# Patient Record
Sex: FEMALE | Race: OTHER | NOT HISPANIC OR LATINO | URBAN - METROPOLITAN AREA
[De-identification: names, ages, dates, MRNs, and addresses within clinical notes are randomized per-mention and may not be internally consistent; named-entity substitution may affect disease eponyms.]

---

## 2021-02-02 ENCOUNTER — INPATIENT (INPATIENT)
Facility: HOSPITAL | Age: 26
LOS: 0 days | Discharge: HOME | End: 2021-02-03
Attending: OBSTETRICS & GYNECOLOGY | Admitting: OBSTETRICS & GYNECOLOGY

## 2021-02-02 VITALS
TEMPERATURE: 99 F | SYSTOLIC BLOOD PRESSURE: 130 MMHG | DIASTOLIC BLOOD PRESSURE: 83 MMHG | HEART RATE: 111 BPM | RESPIRATION RATE: 16 BRPM

## 2021-02-02 DIAGNOSIS — O26.899 OTHER SPECIFIED PREGNANCY RELATED CONDITIONS, UNSPECIFIED TRIMESTER: ICD-10-CM

## 2021-02-02 DIAGNOSIS — Z3A.00 WEEKS OF GESTATION OF PREGNANCY NOT SPECIFIED: ICD-10-CM

## 2021-02-02 LAB
BASOPHILS # BLD AUTO: 0.06 K/UL — SIGNIFICANT CHANGE UP (ref 0–0.2)
BASOPHILS NFR BLD AUTO: 0.4 % — SIGNIFICANT CHANGE UP (ref 0–2)
BLD GP AB SCN SERPL QL: NEGATIVE — SIGNIFICANT CHANGE UP
EOSINOPHIL # BLD AUTO: 0.11 K/UL — SIGNIFICANT CHANGE UP (ref 0–0.5)
EOSINOPHIL NFR BLD AUTO: 0.8 % — SIGNIFICANT CHANGE UP (ref 0–6)
HCT VFR BLD CALC: 38.8 % — SIGNIFICANT CHANGE UP (ref 34.5–45)
HGB BLD-MCNC: 12.7 G/DL — SIGNIFICANT CHANGE UP (ref 11.5–15.5)
IANC: 9.93 K/UL — HIGH (ref 1.5–8.5)
IMM GRANULOCYTES NFR BLD AUTO: 0.9 % — SIGNIFICANT CHANGE UP (ref 0–1.5)
LYMPHOCYTES # BLD AUTO: 21.7 % — SIGNIFICANT CHANGE UP (ref 13–44)
LYMPHOCYTES # BLD AUTO: 3.07 K/UL — SIGNIFICANT CHANGE UP (ref 1–3.3)
MCHC RBC-ENTMCNC: 27.7 PG — SIGNIFICANT CHANGE UP (ref 27–34)
MCHC RBC-ENTMCNC: 32.7 GM/DL — SIGNIFICANT CHANGE UP (ref 32–36)
MCV RBC AUTO: 84.5 FL — SIGNIFICANT CHANGE UP (ref 80–100)
MONOCYTES # BLD AUTO: 0.84 K/UL — SIGNIFICANT CHANGE UP (ref 0–0.9)
MONOCYTES NFR BLD AUTO: 5.9 % — SIGNIFICANT CHANGE UP (ref 2–14)
NEUTROPHILS # BLD AUTO: 9.93 K/UL — HIGH (ref 1.8–7.4)
NEUTROPHILS NFR BLD AUTO: 70.3 % — SIGNIFICANT CHANGE UP (ref 43–77)
NRBC # BLD: 0 /100 WBCS — SIGNIFICANT CHANGE UP
NRBC # FLD: 0 K/UL — SIGNIFICANT CHANGE UP
PLATELET # BLD AUTO: 254 K/UL — SIGNIFICANT CHANGE UP (ref 150–400)
RBC # BLD: 4.59 M/UL — SIGNIFICANT CHANGE UP (ref 3.8–5.2)
RBC # FLD: 15.7 % — HIGH (ref 10.3–14.5)
RH IG SCN BLD-IMP: POSITIVE — SIGNIFICANT CHANGE UP
RH IG SCN BLD-IMP: POSITIVE — SIGNIFICANT CHANGE UP
SARS-COV-2 IGG SERPL QL IA: POSITIVE
SARS-COV-2 IGM SERPL IA-ACNC: 23.4 INDEX — HIGH
SARS-COV-2 RNA SPEC QL NAA+PROBE: SIGNIFICANT CHANGE UP
T PALLIDUM AB TITR SER: NEGATIVE — SIGNIFICANT CHANGE UP
WBC # BLD: 14.14 K/UL — HIGH (ref 3.8–10.5)
WBC # FLD AUTO: 14.14 K/UL — HIGH (ref 3.8–10.5)

## 2021-02-02 RX ORDER — HYDROCORTISONE 1 %
1 OINTMENT (GRAM) TOPICAL EVERY 6 HOURS
Refills: 0 | Status: DISCONTINUED | OUTPATIENT
Start: 2021-02-02 | End: 2021-02-03

## 2021-02-02 RX ORDER — LANOLIN
1 OINTMENT (GRAM) TOPICAL EVERY 6 HOURS
Refills: 0 | Status: DISCONTINUED | OUTPATIENT
Start: 2021-02-02 | End: 2021-02-03

## 2021-02-02 RX ORDER — AER TRAVELER 0.5 G/1
1 SOLUTION RECTAL; TOPICAL EVERY 4 HOURS
Refills: 0 | Status: DISCONTINUED | OUTPATIENT
Start: 2021-02-02 | End: 2021-02-03

## 2021-02-02 RX ORDER — SODIUM CHLORIDE 9 MG/ML
500 INJECTION INTRAMUSCULAR; INTRAVENOUS; SUBCUTANEOUS ONCE
Refills: 0 | Status: DISCONTINUED | OUTPATIENT
Start: 2021-02-02 | End: 2021-02-03

## 2021-02-02 RX ORDER — MAGNESIUM HYDROXIDE 400 MG/1
30 TABLET, CHEWABLE ORAL
Refills: 0 | Status: DISCONTINUED | OUTPATIENT
Start: 2021-02-02 | End: 2021-02-03

## 2021-02-02 RX ORDER — SODIUM CHLORIDE 9 MG/ML
1000 INJECTION INTRAMUSCULAR; INTRAVENOUS; SUBCUTANEOUS
Refills: 0 | Status: DISCONTINUED | OUTPATIENT
Start: 2021-02-02 | End: 2021-02-03

## 2021-02-02 RX ORDER — SODIUM CHLORIDE 9 MG/ML
3 INJECTION INTRAMUSCULAR; INTRAVENOUS; SUBCUTANEOUS EVERY 8 HOURS
Refills: 0 | Status: DISCONTINUED | OUTPATIENT
Start: 2021-02-02 | End: 2021-02-03

## 2021-02-02 RX ORDER — SODIUM CHLORIDE 9 MG/ML
500 INJECTION, SOLUTION INTRAVENOUS ONCE
Refills: 0 | Status: COMPLETED | OUTPATIENT
Start: 2021-02-02 | End: 2021-02-02

## 2021-02-02 RX ORDER — OXYCODONE HYDROCHLORIDE 5 MG/1
5 TABLET ORAL ONCE
Refills: 0 | Status: DISCONTINUED | OUTPATIENT
Start: 2021-02-02 | End: 2021-02-03

## 2021-02-02 RX ORDER — KETOROLAC TROMETHAMINE 30 MG/ML
30 SYRINGE (ML) INJECTION ONCE
Refills: 0 | Status: DISCONTINUED | OUTPATIENT
Start: 2021-02-02 | End: 2021-02-02

## 2021-02-02 RX ORDER — AMPICILLIN TRIHYDRATE 250 MG
2 CAPSULE ORAL ONCE
Refills: 0 | Status: COMPLETED | OUTPATIENT
Start: 2021-02-02 | End: 2021-02-02

## 2021-02-02 RX ORDER — ACETAMINOPHEN 500 MG
975 TABLET ORAL
Refills: 0 | Status: DISCONTINUED | OUTPATIENT
Start: 2021-02-02 | End: 2021-02-03

## 2021-02-02 RX ORDER — SODIUM CHLORIDE 9 MG/ML
1000 INJECTION, SOLUTION INTRAVENOUS
Refills: 0 | Status: DISCONTINUED | OUTPATIENT
Start: 2021-02-02 | End: 2021-02-02

## 2021-02-02 RX ORDER — OXYCODONE HYDROCHLORIDE 5 MG/1
5 TABLET ORAL
Refills: 0 | Status: DISCONTINUED | OUTPATIENT
Start: 2021-02-02 | End: 2021-02-03

## 2021-02-02 RX ORDER — IBUPROFEN 200 MG
600 TABLET ORAL EVERY 6 HOURS
Refills: 0 | Status: DISCONTINUED | OUTPATIENT
Start: 2021-02-02 | End: 2021-02-03

## 2021-02-02 RX ORDER — OXYTOCIN 10 UNIT/ML
VIAL (ML) INJECTION
Qty: 20 | Refills: 0 | Status: DISCONTINUED | OUTPATIENT
Start: 2021-02-02 | End: 2021-02-02

## 2021-02-02 RX ORDER — OXYTOCIN 10 UNIT/ML
2 VIAL (ML) INJECTION
Qty: 30 | Refills: 0 | Status: DISCONTINUED | OUTPATIENT
Start: 2021-02-02 | End: 2021-02-03

## 2021-02-02 RX ORDER — SIMETHICONE 80 MG/1
80 TABLET, CHEWABLE ORAL EVERY 4 HOURS
Refills: 0 | Status: DISCONTINUED | OUTPATIENT
Start: 2021-02-02 | End: 2021-02-03

## 2021-02-02 RX ORDER — DIBUCAINE 1 %
1 OINTMENT (GRAM) RECTAL EVERY 6 HOURS
Refills: 0 | Status: DISCONTINUED | OUTPATIENT
Start: 2021-02-02 | End: 2021-02-03

## 2021-02-02 RX ORDER — AMPICILLIN TRIHYDRATE 250 MG
1 CAPSULE ORAL EVERY 4 HOURS
Refills: 0 | Status: DISCONTINUED | OUTPATIENT
Start: 2021-02-02 | End: 2021-02-02

## 2021-02-02 RX ORDER — TETANUS TOXOID, REDUCED DIPHTHERIA TOXOID AND ACELLULAR PERTUSSIS VACCINE, ADSORBED 5; 2.5; 8; 8; 2.5 [IU]/.5ML; [IU]/.5ML; UG/.5ML; UG/.5ML; UG/.5ML
0.5 SUSPENSION INTRAMUSCULAR ONCE
Refills: 0 | Status: DISCONTINUED | OUTPATIENT
Start: 2021-02-02 | End: 2021-02-03

## 2021-02-02 RX ORDER — DIPHENHYDRAMINE HCL 50 MG
25 CAPSULE ORAL EVERY 6 HOURS
Refills: 0 | Status: DISCONTINUED | OUTPATIENT
Start: 2021-02-02 | End: 2021-02-03

## 2021-02-02 RX ORDER — OXYTOCIN 10 UNIT/ML
333.33 VIAL (ML) INJECTION
Qty: 20 | Refills: 0 | Status: DISCONTINUED | OUTPATIENT
Start: 2021-02-02 | End: 2021-02-03

## 2021-02-02 RX ORDER — PRAMOXINE HYDROCHLORIDE 150 MG/15G
1 AEROSOL, FOAM RECTAL EVERY 4 HOURS
Refills: 0 | Status: DISCONTINUED | OUTPATIENT
Start: 2021-02-02 | End: 2021-02-03

## 2021-02-02 RX ORDER — IBUPROFEN 200 MG
600 TABLET ORAL EVERY 6 HOURS
Refills: 0 | Status: COMPLETED | OUTPATIENT
Start: 2021-02-02 | End: 2022-01-01

## 2021-02-02 RX ORDER — BENZOCAINE 10 %
1 GEL (GRAM) MUCOUS MEMBRANE EVERY 6 HOURS
Refills: 0 | Status: DISCONTINUED | OUTPATIENT
Start: 2021-02-02 | End: 2021-02-03

## 2021-02-02 RX ADMIN — Medication 30 MILLIGRAM(S): at 14:50

## 2021-02-02 RX ADMIN — Medication 2 MILLIUNIT(S)/MIN: at 08:19

## 2021-02-02 RX ADMIN — SODIUM CHLORIDE 1000 MILLILITER(S): 9 INJECTION, SOLUTION INTRAVENOUS at 05:15

## 2021-02-02 RX ADMIN — Medication 216 GRAM(S): at 05:27

## 2021-02-02 RX ADMIN — Medication 108 GRAM(S): at 09:30

## 2021-02-02 RX ADMIN — Medication 1000 MILLIUNIT(S)/MIN: at 14:56

## 2021-02-02 RX ADMIN — SODIUM CHLORIDE 3 MILLILITER(S): 9 INJECTION INTRAMUSCULAR; INTRAVENOUS; SUBCUTANEOUS at 15:15

## 2021-02-02 RX ADMIN — SODIUM CHLORIDE 1000 MILLILITER(S): 9 INJECTION, SOLUTION INTRAVENOUS at 04:25

## 2021-02-02 RX ADMIN — Medication 975 MILLIGRAM(S): at 20:54

## 2021-02-02 RX ADMIN — SODIUM CHLORIDE 3 MILLILITER(S): 9 INJECTION INTRAMUSCULAR; INTRAVENOUS; SUBCUTANEOUS at 20:55

## 2021-02-02 NOTE — OB PROVIDER TRIAGE NOTE - NSHPPHYSICALEXAM_GEN_ALL_CORE
Vital Signs Last 24 Hrs  T(C): 37 (02 Feb 2021 03:56), Max: 37 (02 Feb 2021 03:56)  T(F): 98.6 (02 Feb 2021 03:56), Max: 98.6 (02 Feb 2021 03:56)  HR: 103 (02 Feb 2021 04:25) (103 - 111)  BP: 127/69 (02 Feb 2021 04:25) (127/69 - 130/83)  RR: 16 (02 Feb 2021 03:56) (16 - 16)  TAS: cephalic presentation  FHR: 170 baseline with moderate variability, 10x10 accelerations present  CTX: 3 to 4 minutes apar

## 2021-02-02 NOTE — OB PROVIDER H&P - NSHPPHYSICALEXAM_GEN_ALL_CORE
Vital Signs Last 24 Hrs  T(C): 37 (02 Feb 2021 03:56), Max: 37 (02 Feb 2021 03:56)  T(F): 98.6 (02 Feb 2021 03:56), Max: 98.6 (02 Feb 2021 03:56)  HR: 103 (02 Feb 2021 04:25) (103 - 111)  BP: 127/69 (02 Feb 2021 04:25) (127/69 - 130/83)  RR: 16 (02 Feb 2021 03:56) (16 - 16)  TAS: cephalic presentation  FHR: 170 baseline with moderate variability, 10x10 accelerations present  CTX: 3 to 4 minutes apar Vital Signs Last 24 Hrs  T(C): 37 (02 Feb 2021 03:56), Max: 37 (02 Feb 2021 03:56)  T(F): 98.6 (02 Feb 2021 03:56), Max: 98.6 (02 Feb 2021 03:56)  HR: 103 (02 Feb 2021 04:25) (103 - 111)  BP: 127/69 (02 Feb 2021 04:25) (127/69 - 130/83)  RR: 16 (02 Feb 2021 03:56) (16 - 16)  TAS: cephalic presentation  FHR: 170 baseline with moderate variability, 10x10 accelerations present  CTX: 3 to 4 minutes apar  SVE: 4/70/-3  EFW 3625

## 2021-02-02 NOTE — OB PROVIDER TRIAGE NOTE - NSOBPROVIDERNOTE_OBGYN_ALL_OB_FT
Evidence of active labor  - admit to labor and delivery  30 3/7 for active labor, d/w   - cleared for epidural placement, d/w   - for Ampicillin, GBS positive  - COVID testing completed for patient and significant other  -  cc bolus initiated  - bp initial 130/83, for bp monitoring

## 2021-02-02 NOTE — OB PROVIDER LABOR PROGRESS NOTE - ASSESSMENT
24yo  at 39w4d in labor now fully dilated with no urge to push  - AROM 11:30am clear fluid  - room being set up  - peanut ball  - continuous monitoring  - routine labor care  - IUPC placed    d/w Dr. Saroj Purcell PGY1

## 2021-02-02 NOTE — OB PROVIDER H&P - ASSESSMENT
Evidence of active labor  - admit to labor and delivery  30 3/7 for active labor, d/w   -  cc bolus initiated, for fetal tachycardia  - - bp initial 130/83, for bp monitoring  - cleared for epidural placement, d/w   - for Ampicillin, GBS positive  - COVID testing completed for patient and significant other

## 2021-02-02 NOTE — OB PROVIDER DELIVERY SUMMARY - NSPROVIDERDELIVERYNOTE_OBGYN_ALL_OB_FT
Pt was noted to be fully dilated with the urge to push. Spontaneous vaginal delivery of liveborn female from YINKA position. Head, shoulders, and body delivered easily. Delayed cord clamping. Cord clamped and cut and infant handed to mom. 1st degree laceration noted in the perineum and left hayde-urethral laceration noted and repaired with 3.0 chromic and 2.0 chromic. Placenta delivered spontaneously and intact. Inspection revealed intact placenta and uterine exploration was performed with no residual placenta. Fundus was firm and excellent hemostasis noted. , count correct x2.

## 2021-02-02 NOTE — OB PROVIDER TRIAGE NOTE - HISTORY OF PRESENT ILLNESS
's patient is a 24 y/o EGA 39 3/7  reports of painful contractions every 5 minutes. Patient denies loss of fluid, vaginal bleeding. GBS status: positive 2021     COVID: Denies symptoms, recent or past exposure/diagnosis for patient and significant others  AP complications: GBS positive  Medical History: Denies  Surgical History: Denies  OBGYN History: Denies sab, top, stds, herpes

## 2021-02-02 NOTE — OB PROVIDER LABOR PROGRESS NOTE - ASSESSMENT
26yo  at 39w3d in labor  - AROM clear fluid at 11:30am  - continue pitocin  - routine labor care  - anticipate   - continue ampicillin for Amp    d/w Dr. Nikunj Purcell PGY1 26yo  at 39w3d in labor  - AROM clear fluid at 11:30am  - continue pitocin  - routine labor care  - anticipate   - continue ampicillin for GBS+    d/w Dr. Nikunj Purcell PGY1

## 2021-02-02 NOTE — OB PROVIDER LABOR PROGRESS NOTE - ASSESSMENT
24yo  in labor  - peanut ball until pt feels a strong urge to push  - continuous monitoring  - routine labor care  - anticipate     d/w Dr. Nikunj Purcell PGy1

## 2021-02-02 NOTE — CHART NOTE - NSCHARTNOTEFT_GEN_A_CORE
NP note    Pt seen for cervical evaluation sp epidural; pt comfortable    T(C): 36.9 (2021 05:30), Max: 37 (2021 03:56)  T(F): 98.4 (2021 05:30), Max: 98.6 (2021 03:56)  HR: 93 (2021 06:56) (91 - 117)  BP: 119/69 (2021 06:56) (114/63 - 131/81)  RR: 16 (2021 05:30) (16 - 16)  SpO2: 96% (2021 07:01) (91% - 100%)  EFM Cat I  [place] q2-5min  SVE 4/80/-3 intact high    24 y/o  @39+3w admitted in early labor with unchanged exam    -For pitocin  -D.W Dr. Almarza PGY4    LUÍS melvin

## 2021-02-02 NOTE — OB PROVIDER H&P - HISTORY OF PRESENT ILLNESS
's patient is a 26 y/o EGA 39 3/7  reports of painful contractions every 5 minutes. Patient denies loss of fluid, vaginal bleeding. GBS status: positive 2021     COVID: Denies symptoms, recent or past exposure/diagnosis for patient and significant others  AP complications: GBS positive  Medical History: Denies  Surgical History: Denies  OBGYN History: Denies sab, top, stds, herpes

## 2021-02-02 NOTE — OB RN DELIVERY SUMMARY - NS_SEPSISRSKCALC_OBGYN_ALL_OB_FT
EOS calculated successfully. EOS Risk Factor: 0.5/1000 live births ([facility] national incidence); GA=39w3d; Temp=98.78; ROM=2.05; GBS='Positive'; Antibiotics='GBS specific antibiotics > 2 hrs prior to birth'

## 2021-02-02 NOTE — OB NEONATOLOGY/PEDIATRICIAN DELIVERY SUMMARY - NSPEDSNEONOTESA_OBGYN_ALL_OB_FT
Baby is a 39.3 wk GA male female born to a  mother via . PEDS called to delivery for fetal tachycardia and cat. II tracing. Maternal history uncomplicated. Prenatal history uncomplicated. Maternal blood type O+. PNL negative, non-reactive, and immune. GBS positive on , treated with amp x4. AROM on  at 11:26, clear fluids. Baby born vigorous and crying spontaneously. Warmed, dried, stimulated. Apgars 9/9. EOS .06. Mom plans to breastfeed and consents hepB.

## 2021-02-02 NOTE — OB PROVIDER LABOR PROGRESS NOTE - NS_SUBJECTIVE/OBJECTIVE_OBGYN_ALL_OB_FT
Pt examined at bedside for cervical change
Pt examined at bedside after feeling more pressure and urge. Pushed with pt during 2 contractions and she was able to move the head from 0 station to +1. Pt then stated that her urge to push isnt strong anymore.
Pt examined at bedside for cervical change

## 2021-02-03 ENCOUNTER — TRANSCRIPTION ENCOUNTER (OUTPATIENT)
Age: 26
End: 2021-02-03

## 2021-02-03 VITALS
RESPIRATION RATE: 20 BRPM | HEART RATE: 87 BPM | SYSTOLIC BLOOD PRESSURE: 116 MMHG | TEMPERATURE: 98 F | OXYGEN SATURATION: 100 % | DIASTOLIC BLOOD PRESSURE: 78 MMHG

## 2021-02-03 RX ORDER — ACETAMINOPHEN 500 MG
3 TABLET ORAL
Qty: 0 | Refills: 0 | DISCHARGE
Start: 2021-02-03

## 2021-02-03 RX ORDER — INFLUENZA VIRUS VACCINE 15; 15; 15; 15 UG/.5ML; UG/.5ML; UG/.5ML; UG/.5ML
0.5 SUSPENSION INTRAMUSCULAR ONCE
Refills: 0 | Status: COMPLETED | OUTPATIENT
Start: 2021-02-03 | End: 2021-02-03

## 2021-02-03 RX ORDER — IBUPROFEN 200 MG
1 TABLET ORAL
Qty: 0 | Refills: 0 | DISCHARGE
Start: 2021-02-03

## 2021-02-03 RX ADMIN — Medication 600 MILLIGRAM(S): at 00:43

## 2021-02-03 RX ADMIN — SODIUM CHLORIDE 3 MILLILITER(S): 9 INJECTION INTRAMUSCULAR; INTRAVENOUS; SUBCUTANEOUS at 05:10

## 2021-02-03 RX ADMIN — Medication 1 TABLET(S): at 09:37

## 2021-02-03 RX ADMIN — Medication 600 MILLIGRAM(S): at 09:37

## 2021-02-03 RX ADMIN — Medication 975 MILLIGRAM(S): at 04:10

## 2021-02-03 RX ADMIN — SODIUM CHLORIDE 3 MILLILITER(S): 9 INJECTION INTRAMUSCULAR; INTRAVENOUS; SUBCUTANEOUS at 14:00

## 2021-02-03 RX ADMIN — INFLUENZA VIRUS VACCINE 0.5 MILLILITER(S): 15; 15; 15; 15 SUSPENSION INTRAMUSCULAR at 14:48

## 2021-02-03 NOTE — PROGRESS NOTE ADULT - ATTENDING COMMENTS
Associate Chief of L & D (Late entry)     I have not met this patient before today. she received her care at [facility] and she was admitted by Dr Sauer/Jim in early labor and delivered  vaginally by Dr BECKHAM Progress Note:  PPD#1    S: 24yo  PPD#1 s/p . Patient denies any complaints at this time    O:  Vitals:  Vital Signs Last 24 Hrs  T(C): 36.8 (2021 10:07), Max: 37 (2021 14:11)  T(F): 98.2 (2021 10:07), Max: 98.6 (2021 14:11)  HR: 86 (2021 10:07) (81 - 160)  BP: 115/75 (2021 10:07) (105/58 - 129/65)  RR: 18 (2021 10:07) (18 - 18)  SpO2: 98% (2021 10:07) (93% - 99%)    MEDICATIONS  (STANDING):  acetaminophen   Tablet .. 975 milliGRAM(s) Oral <User Schedule>  diphtheria/tetanus/pertussis (acellular) Vaccine (ADAcel) 0.5 milliLiter(s) IntraMuscular once  ibuprofen  Tablet. 600 milliGRAM(s) Oral every 6 hours  oxytocin Infusion 333.333 milliUNIT(s)/Min (1000 mL/Hr) IV Continuous <Continuous>  oxytocin Infusion 2 milliUNIT(s)/Min (2 mL/Hr) IV Continuous <Continuous>  prenatal multivitamin 1 Tablet(s) Oral daily  sodium chloride 0.9% Bolus 500 milliLiter(s) IV Bolus once  sodium chloride 0.9% lock flush 3 milliLiter(s) IV Push every 8 hours  sodium chloride 0.9%. 1000 milliLiter(s) (125 mL/Hr) IV Continuous <Continuous>      Labs:  Blood type: O Positive  Rubella IgG: RPR: Negative                          12.7   14.14<H> >-----------< 254    (  @ 04:34 )             38.8    Physical Exam:  General: NAD  Abdomen: soft, fundus firm, NT, ND  Vaginal: Lochia scant  Extremities: No erythema/ trace edema    A/P: 24yo PPD#1 s/p  and repair of 1st degree and left periurethral   - Patient stable for discharge and will follow up with Dr Ashish Cordon M.D., M.B.A., M.S.

## 2021-02-03 NOTE — DISCHARGE NOTE OB - CARE PROVIDER_API CALL
Luana Sheffield)  Obstetrics and Gynecology  [address], [address]  [place] [address]  Phone: [phone]  Fax: [phone]  Follow Up Time:

## 2021-02-03 NOTE — DISCHARGE NOTE OB - PLAN OF CARE
Make your follow-up appointment with your doctor as ordered.  Make an appt in 6 weeks for a routine postpartum visit.     No heavy lifting, driving, or strenuous activity for 6 weeks. Nothing per vagina such as tampons, intercourse, douches, or tub baths for 6 weeks or until you see your doctor. Call your doctor with any signs and symptoms of infection such as fever, chills, nausea, or vomiting. Call your doctor if you're unable to tolerate food, increase in vaginal bleeding, or have difficulty urinating. Call your doctor if you have pain that is not relieved by your prescribed medications. Notify your doctor with any other concerns.     Call [phone] if you have any of these concerns in the next 6 weeks. full recovery

## 2021-02-03 NOTE — DISCHARGE NOTE OB - MEDICATION SUMMARY - MEDICATIONS TO TAKE
I will START or STAY ON the medications listed below when I get home from the hospital:    acetaminophen 325 mg oral tablet  -- 3 tab(s) by mouth   -- Indication: For Pain    ibuprofen 600 mg oral tablet  -- 1 tab(s) by mouth every 6 hours  -- Indication: For Pain    Prenatal 1 oral capsule  -- 1 tab(s) orally  -- Indication: For Vitamins

## 2021-02-03 NOTE — DISCHARGE NOTE OB - ADDITIONAL INSTRUCTIONS
Make your follow-up appointment with your doctor as ordered.  Make an appt in 6 weeks for a routine postpartum visit.     No heavy lifting, driving, or strenuous activity for 6 weeks. Nothing per vagina such as tampons, intercourse, douches, or tub baths for 6 weeks or until you see your doctor. Call your doctor with any signs and symptoms of infection such as fever, chills, nausea, or vomiting. Call your doctor if you're unable to tolerate food, increase in vaginal bleeding, or have difficulty urinating. Call your doctor if you have pain that is not relieved by your prescribed medications. Notify your doctor with any other concerns.     Call [phone] if you have any of these concerns in the next 6 weeks.

## 2021-02-03 NOTE — DISCHARGE NOTE OB - PRINCIPAL DIAGNOSIS
Monitor summary: SR 82-93, NE .18, QRS .10, QT .34, per strip from monitor room.    (normal spontaneous vaginal delivery)

## 2021-02-03 NOTE — PROGRESS NOTE ADULT - SUBJECTIVE AND OBJECTIVE BOX
OB Progress Note:  PPD#1    S: 26yo PPD#1 s/p . Patient feels well. Pain is well controlled. She is tolerating a regular diet and passing flatus. She is voiding spontaneously, and ambulating without difficulty. Denies CP/SOB. Denies lightheadedness/dizziness. Denies N/V.    O:  Vitals:  Vital Signs Last 24 Hrs  T(C): 36.6 (2021 05:29), Max: 37.1 (2021 10:04)  T(F): 97.9 (2021 05:29), Max: 98.78 (2021 10:04)  HR: 81 (2021 05:29) (81 - 160)  BP: 111/65 (2021 05:29) (97/58 - 129/65)  BP(mean): --  RR: 18 (2021 05:29) (16 - 18)  SpO2: 98% (2021 05:29) (92% - 99%)    MEDICATIONS  (STANDING):  acetaminophen   Tablet .. 975 milliGRAM(s) Oral <User Schedule>  diphtheria/tetanus/pertussis (acellular) Vaccine (ADAcel) 0.5 milliLiter(s) IntraMuscular once  ibuprofen  Tablet. 600 milliGRAM(s) Oral every 6 hours  oxytocin Infusion 333.333 milliUNIT(s)/Min (1000 mL/Hr) IV Continuous <Continuous>  oxytocin Infusion 2 milliUNIT(s)/Min (2 mL/Hr) IV Continuous <Continuous>  prenatal multivitamin 1 Tablet(s) Oral daily  sodium chloride 0.9% Bolus 500 milliLiter(s) IV Bolus once  sodium chloride 0.9% lock flush 3 milliLiter(s) IV Push every 8 hours  sodium chloride 0.9%. 1000 milliLiter(s) (125 mL/Hr) IV Continuous <Continuous>      Labs:  Blood type: O Positive  Rubella IgG: RPR: Negative                          12.7   14.14<H> >-----------< 254    (  @ 04:34 )             38.8    Physical Exam:  General: NAD  Abdomen: soft, non-tender, non-distended, fundus firm  Vaginal: Lochia wnl  Extremities: No erythema/edema    A/P: 26yo PPD#1 s/p . Patient is stable and doing well post-partum.   - Pain well controlled, continue current pain regimen  - Increase ambulation, SCDs when not ambulating  - Continue regular diet  - Contraception: patient desires Mirena IUD at 6 week pp visit    Noris Mcmullen MD PGY1

## 2021-02-03 NOTE — DISCHARGE NOTE OB - CARE PLAN
Principal Discharge DX:	 (normal spontaneous vaginal delivery)  Goal:	full recovery  Assessment and plan of treatment:	Make your follow-up appointment with your doctor as ordered.  Make an appt in 6 weeks for a routine postpartum visit.     No heavy lifting, driving, or strenuous activity for 6 weeks. Nothing per vagina such as tampons, intercourse, douches, or tub baths for 6 weeks or until you see your doctor. Call your doctor with any signs and symptoms of infection such as fever, chills, nausea, or vomiting. Call your doctor if you're unable to tolerate food, increase in vaginal bleeding, or have difficulty urinating. Call your doctor if you have pain that is not relieved by your prescribed medications. Notify your doctor with any other concerns.     Call [phone] if you have any of these concerns in the next 6 weeks.

## 2021-02-03 NOTE — DISCHARGE NOTE OB - HOSPITAL COURSE
Patient was admitted to L+D in labor. Patient had an uncomplicated  followed by an uncomplicated postpartum course.  EBL: 300  Hct: 38.8  On Postpartum day 1, patient was discharged home in stable condition, voiding spontaneously, pain well controlled, ambulating, tolerating PO and with normal vital signs. Patient's postpartum birth control plan is Mirena IUD to be administered at her 6-week postpartum visit. Patient plans to follow up at the [facility] in 6 weeks. Telephone number and clinic information provided prior to discharge.

## 2021-02-03 NOTE — DISCHARGE NOTE OB - MATERIALS PROVIDED
Vaccinations/[facility]  Screening Program/  Immunization Record/Breastfeeding Log/Guide to Postpartum Care/[facility] Hearing Screen Program/Shaken Baby Prevention Handout/Birth Certificate Instructions

## 2021-02-03 NOTE — DISCHARGE NOTE OB - PATIENT PORTAL LINK FT
You can access the FollowMyHealth Patient Portal offered by [facility] by registering at the following website: [email or web address]. By joining [facility]’s FollowMyHealth portal, you will also be able to view your health information using other applications (apps) compatible with our system.

## 2022-01-01 NOTE — OB RN PATIENT PROFILE - AGENT'S NAME
Patient appears to have shown improvement since removing liquid fortifier.    Plan:  -Continue to encourage nippling  -Continue working with OT and SLP to optimize feeding ability     Darci Ramirez

## 2022-01-08 NOTE — DISCHARGE NOTE OB - SWOLLEN, PAINFUL HOT AREAS AND/OR STREAKS ON THE BREAST
SOB this evening while at work. Started having body aches/chills this evening as well. Afebrile at home. Denies cough. Denies CP   Statement Selected

## 2023-07-25 PROBLEM — Z78.9 OTHER SPECIFIED HEALTH STATUS: Chronic | Status: ACTIVE | Noted: 2021-02-02

## 2023-08-07 ENCOUNTER — APPOINTMENT (OUTPATIENT)
Dept: MAMMOGRAPHY | Facility: IMAGING CENTER | Age: 28
End: 2023-08-07
Payer: MEDICAID

## 2023-08-07 ENCOUNTER — OUTPATIENT (OUTPATIENT)
Dept: OUTPATIENT SERVICES | Facility: HOSPITAL | Age: 28
LOS: 1 days | End: 2023-08-07
Payer: MEDICAID

## 2023-08-07 ENCOUNTER — APPOINTMENT (OUTPATIENT)
Dept: ULTRASOUND IMAGING | Facility: IMAGING CENTER | Age: 28
End: 2023-08-07
Payer: MEDICAID

## 2023-08-07 DIAGNOSIS — Z00.8 ENCOUNTER FOR OTHER GENERAL EXAMINATION: ICD-10-CM

## 2023-08-07 PROBLEM — Z00.00 ENCOUNTER FOR PREVENTIVE HEALTH EXAMINATION: Status: ACTIVE | Noted: 2023-08-07

## 2023-08-07 PROCEDURE — 76641 ULTRASOUND BREAST COMPLETE: CPT

## 2023-08-07 PROCEDURE — 76641 ULTRASOUND BREAST COMPLETE: CPT | Mod: 26,50

## 2023-08-21 ENCOUNTER — APPOINTMENT (OUTPATIENT)
Dept: ULTRASOUND IMAGING | Facility: IMAGING CENTER | Age: 28
End: 2023-08-21
Payer: MEDICAID

## 2023-08-21 ENCOUNTER — OUTPATIENT (OUTPATIENT)
Dept: OUTPATIENT SERVICES | Facility: HOSPITAL | Age: 28
LOS: 1 days | End: 2023-08-21
Payer: MEDICAID

## 2023-08-21 ENCOUNTER — TRANSCRIPTION ENCOUNTER (OUTPATIENT)
Age: 28
End: 2023-08-21

## 2023-08-21 DIAGNOSIS — Z00.8 ENCOUNTER FOR OTHER GENERAL EXAMINATION: ICD-10-CM

## 2023-08-21 PROCEDURE — 19083 BX BREAST 1ST LESION US IMAG: CPT | Mod: RT

## 2023-08-21 PROCEDURE — 19083 BX BREAST 1ST LESION US IMAG: CPT

## 2023-08-21 PROCEDURE — 88305 TISSUE EXAM BY PATHOLOGIST: CPT

## 2023-08-21 PROCEDURE — 88305 TISSUE EXAM BY PATHOLOGIST: CPT | Mod: 26

## 2023-08-21 PROCEDURE — A4648: CPT

## 2023-08-23 LAB — SURGICAL PATHOLOGY STUDY: SIGNIFICANT CHANGE UP

## 2024-02-23 ENCOUNTER — EMERGENCY (EMERGENCY)
Facility: HOSPITAL | Age: 29
LOS: 1 days | Discharge: HOME | End: 2024-02-23
Attending: EMERGENCY MEDICINE
Payer: MEDICAID

## 2024-02-23 VITALS
SYSTOLIC BLOOD PRESSURE: 137 MMHG | WEIGHT: 201.94 LBS | RESPIRATION RATE: 18 BRPM | DIASTOLIC BLOOD PRESSURE: 75 MMHG | TEMPERATURE: 98 F | HEIGHT: 66 IN | HEART RATE: 90 BPM | OXYGEN SATURATION: 98 %

## 2024-02-23 VITALS
TEMPERATURE: 98 F | RESPIRATION RATE: 19 BRPM | DIASTOLIC BLOOD PRESSURE: 60 MMHG | HEART RATE: 80 BPM | SYSTOLIC BLOOD PRESSURE: 109 MMHG | OXYGEN SATURATION: 97 %

## 2024-02-23 LAB
ALBUMIN SERPL ELPH-MCNC: 4.2 G/DL — SIGNIFICANT CHANGE UP (ref 3.3–5)
ALP SERPL-CCNC: 87 U/L — SIGNIFICANT CHANGE UP (ref 40–120)
ALT FLD-CCNC: 8 U/L — LOW (ref 10–45)
ANION GAP SERPL CALC-SCNC: 11 MMOL/L — SIGNIFICANT CHANGE UP (ref 5–17)
APPEARANCE UR: CLEAR — SIGNIFICANT CHANGE UP
APTT BLD: 38 SEC — HIGH (ref 24.5–35.6)
AST SERPL-CCNC: 13 U/L — SIGNIFICANT CHANGE UP (ref 10–40)
BACTERIA # UR AUTO: NEGATIVE /HPF — SIGNIFICANT CHANGE UP
BASOPHILS # BLD AUTO: 0.09 K/UL — SIGNIFICANT CHANGE UP (ref 0–0.2)
BASOPHILS NFR BLD AUTO: 0.5 % — SIGNIFICANT CHANGE UP (ref 0–2)
BILIRUB SERPL-MCNC: 0.1 MG/DL — LOW (ref 0.2–1.2)
BILIRUB UR-MCNC: NEGATIVE — SIGNIFICANT CHANGE UP
BUN SERPL-MCNC: 12 MG/DL — SIGNIFICANT CHANGE UP (ref 7–23)
CALCIUM SERPL-MCNC: 9.2 MG/DL — SIGNIFICANT CHANGE UP (ref 8.4–10.5)
CAST: 0 /LPF — SIGNIFICANT CHANGE UP (ref 0–4)
CHLORIDE SERPL-SCNC: 104 MMOL/L — SIGNIFICANT CHANGE UP (ref 96–108)
CO2 SERPL-SCNC: 23 MMOL/L — SIGNIFICANT CHANGE UP (ref 22–31)
COLOR SPEC: YELLOW — SIGNIFICANT CHANGE UP
CREAT SERPL-MCNC: 0.62 MG/DL — SIGNIFICANT CHANGE UP (ref 0.5–1.3)
DIFF PNL FLD: ABNORMAL
EGFR: 125 ML/MIN/1.73M2 — SIGNIFICANT CHANGE UP
EOSINOPHIL # BLD AUTO: 0.35 K/UL — SIGNIFICANT CHANGE UP (ref 0–0.5)
EOSINOPHIL NFR BLD AUTO: 2.1 % — SIGNIFICANT CHANGE UP (ref 0–6)
GLUCOSE SERPL-MCNC: 114 MG/DL — HIGH (ref 70–99)
GLUCOSE UR QL: NEGATIVE MG/DL — SIGNIFICANT CHANGE UP
HCT VFR BLD CALC: 34.1 % — LOW (ref 34.5–45)
HCT VFR BLD CALC: 37.2 % — SIGNIFICANT CHANGE UP (ref 34.5–45)
HGB BLD-MCNC: 11.3 G/DL — LOW (ref 11.5–15.5)
HGB BLD-MCNC: 11.8 G/DL — SIGNIFICANT CHANGE UP (ref 11.5–15.5)
IMM GRANULOCYTES NFR BLD AUTO: 0.4 % — SIGNIFICANT CHANGE UP (ref 0–0.9)
INR BLD: 1.07 RATIO — SIGNIFICANT CHANGE UP (ref 0.85–1.18)
KETONES UR-MCNC: NEGATIVE MG/DL — SIGNIFICANT CHANGE UP
LEUKOCYTE ESTERASE UR-ACNC: NEGATIVE — SIGNIFICANT CHANGE UP
LIDOCAIN IGE QN: 16 U/L — SIGNIFICANT CHANGE UP (ref 7–60)
LYMPHOCYTES # BLD AUTO: 27.5 % — SIGNIFICANT CHANGE UP (ref 13–44)
LYMPHOCYTES # BLD AUTO: 4.53 K/UL — HIGH (ref 1–3.3)
MAGNESIUM SERPL-MCNC: 1.9 MG/DL — SIGNIFICANT CHANGE UP (ref 1.6–2.6)
MCHC RBC-ENTMCNC: 26.6 PG — LOW (ref 27–34)
MCHC RBC-ENTMCNC: 27.4 PG — SIGNIFICANT CHANGE UP (ref 27–34)
MCHC RBC-ENTMCNC: 31.7 GM/DL — LOW (ref 32–36)
MCHC RBC-ENTMCNC: 33.1 GM/DL — SIGNIFICANT CHANGE UP (ref 32–36)
MCV RBC AUTO: 82.8 FL — SIGNIFICANT CHANGE UP (ref 80–100)
MCV RBC AUTO: 84 FL — SIGNIFICANT CHANGE UP (ref 80–100)
MONOCYTES # BLD AUTO: 0.86 K/UL — SIGNIFICANT CHANGE UP (ref 0–0.9)
MONOCYTES NFR BLD AUTO: 5.2 % — SIGNIFICANT CHANGE UP (ref 2–14)
NEUTROPHILS # BLD AUTO: 10.58 K/UL — HIGH (ref 1.8–7.4)
NEUTROPHILS NFR BLD AUTO: 64.3 % — SIGNIFICANT CHANGE UP (ref 43–77)
NITRITE UR-MCNC: NEGATIVE — SIGNIFICANT CHANGE UP
NRBC # BLD: 0 /100 WBCS — SIGNIFICANT CHANGE UP (ref 0–0)
NRBC # BLD: 0 /100 WBCS — SIGNIFICANT CHANGE UP (ref 0–0)
NT-PROBNP SERPL-SCNC: <36 PG/ML — SIGNIFICANT CHANGE UP (ref 0–300)
PH UR: 6 — SIGNIFICANT CHANGE UP (ref 5–8)
PLATELET # BLD AUTO: 318 K/UL — SIGNIFICANT CHANGE UP (ref 150–400)
PLATELET # BLD AUTO: 336 K/UL — SIGNIFICANT CHANGE UP (ref 150–400)
POTASSIUM SERPL-MCNC: 3.8 MMOL/L — SIGNIFICANT CHANGE UP (ref 3.5–5.3)
POTASSIUM SERPL-SCNC: 3.8 MMOL/L — SIGNIFICANT CHANGE UP (ref 3.5–5.3)
PROT SERPL-MCNC: 7.6 G/DL — SIGNIFICANT CHANGE UP (ref 6–8.3)
PROT UR-MCNC: NEGATIVE MG/DL — SIGNIFICANT CHANGE UP
PROTHROM AB SERPL-ACNC: 11.2 SEC — SIGNIFICANT CHANGE UP (ref 9.5–13)
RBC # BLD: 4.12 M/UL — SIGNIFICANT CHANGE UP (ref 3.8–5.2)
RBC # BLD: 4.43 M/UL — SIGNIFICANT CHANGE UP (ref 3.8–5.2)
RBC # FLD: 14.2 % — SIGNIFICANT CHANGE UP (ref 10.3–14.5)
RBC # FLD: 14.4 % — SIGNIFICANT CHANGE UP (ref 10.3–14.5)
RBC CASTS # UR COMP ASSIST: 4 /HPF — SIGNIFICANT CHANGE UP (ref 0–4)
REVIEW: SIGNIFICANT CHANGE UP
SODIUM SERPL-SCNC: 138 MMOL/L — SIGNIFICANT CHANGE UP (ref 135–145)
SP GR SPEC: <1.005 — LOW (ref 1–1.03)
SQUAMOUS # UR AUTO: 0 /HPF — SIGNIFICANT CHANGE UP (ref 0–5)
TROPONIN T, HIGH SENSITIVITY RESULT: <6 NG/L — SIGNIFICANT CHANGE UP (ref 0–51)
TROPONIN T, HIGH SENSITIVITY RESULT: <6 NG/L — SIGNIFICANT CHANGE UP (ref 0–51)
UROBILINOGEN FLD QL: 0.2 MG/DL — SIGNIFICANT CHANGE UP (ref 0.2–1)
WBC # BLD: 13.86 K/UL — HIGH (ref 3.8–10.5)
WBC # BLD: 16.48 K/UL — HIGH (ref 3.8–10.5)
WBC # FLD AUTO: 13.86 K/UL — HIGH (ref 3.8–10.5)
WBC # FLD AUTO: 16.48 K/UL — HIGH (ref 3.8–10.5)
WBC UR QL: 0 /HPF — SIGNIFICANT CHANGE UP (ref 0–5)

## 2024-02-23 PROCEDURE — 93005 ELECTROCARDIOGRAM TRACING: CPT

## 2024-02-23 PROCEDURE — 83880 ASSAY OF NATRIURETIC PEPTIDE: CPT

## 2024-02-23 PROCEDURE — 84484 ASSAY OF TROPONIN QUANT: CPT

## 2024-02-23 PROCEDURE — 71046 X-RAY EXAM CHEST 2 VIEWS: CPT | Mod: 26

## 2024-02-23 PROCEDURE — 85730 THROMBOPLASTIN TIME PARTIAL: CPT

## 2024-02-23 PROCEDURE — 93971 EXTREMITY STUDY: CPT | Mod: 26,LT

## 2024-02-23 PROCEDURE — 85025 COMPLETE CBC W/AUTO DIFF WBC: CPT

## 2024-02-23 PROCEDURE — 85027 COMPLETE CBC AUTOMATED: CPT

## 2024-02-23 PROCEDURE — 83735 ASSAY OF MAGNESIUM: CPT

## 2024-02-23 PROCEDURE — 93971 EXTREMITY STUDY: CPT

## 2024-02-23 PROCEDURE — 99285 EMERGENCY DEPT VISIT HI MDM: CPT | Mod: 25

## 2024-02-23 PROCEDURE — 82553 CREATINE MB FRACTION: CPT

## 2024-02-23 PROCEDURE — 83690 ASSAY OF LIPASE: CPT

## 2024-02-23 PROCEDURE — 71046 X-RAY EXAM CHEST 2 VIEWS: CPT

## 2024-02-23 PROCEDURE — 85610 PROTHROMBIN TIME: CPT

## 2024-02-23 PROCEDURE — 82550 ASSAY OF CK (CPK): CPT

## 2024-02-23 PROCEDURE — 81001 URINALYSIS AUTO W/SCOPE: CPT

## 2024-02-23 PROCEDURE — 80053 COMPREHEN METABOLIC PANEL: CPT

## 2024-02-23 PROCEDURE — 96374 THER/PROPH/DIAG INJ IV PUSH: CPT

## 2024-02-23 RX ORDER — LIDOCAINE 4 G/100G
1 CREAM TOPICAL ONCE
Refills: 0 | Status: COMPLETED | OUTPATIENT
Start: 2024-02-23 | End: 2024-02-23

## 2024-02-23 RX ORDER — ASPIRIN/CALCIUM CARB/MAGNESIUM 324 MG
324 TABLET ORAL ONCE
Refills: 0 | Status: COMPLETED | OUTPATIENT
Start: 2024-02-23 | End: 2024-02-23

## 2024-02-23 RX ORDER — KETOROLAC TROMETHAMINE 30 MG/ML
15 SYRINGE (ML) INJECTION ONCE
Refills: 0 | Status: DISCONTINUED | OUTPATIENT
Start: 2024-02-23 | End: 2024-02-23

## 2024-02-23 RX ORDER — ACETAMINOPHEN 500 MG
975 TABLET ORAL ONCE
Refills: 0 | Status: COMPLETED | OUTPATIENT
Start: 2024-02-23 | End: 2024-02-23

## 2024-02-23 RX ORDER — DIAZEPAM 5 MG
5 TABLET ORAL ONCE
Refills: 0 | Status: DISCONTINUED | OUTPATIENT
Start: 2024-02-23 | End: 2024-02-23

## 2024-02-23 RX ADMIN — Medication 975 MILLIGRAM(S): at 04:26

## 2024-02-23 RX ADMIN — LIDOCAINE 1 PATCH: 4 CREAM TOPICAL at 07:38

## 2024-02-23 RX ADMIN — Medication 5 MILLIGRAM(S): at 05:44

## 2024-02-23 RX ADMIN — LIDOCAINE 1 PATCH: 4 CREAM TOPICAL at 04:26

## 2024-02-23 RX ADMIN — Medication 15 MILLIGRAM(S): at 07:38

## 2024-02-23 RX ADMIN — Medication 975 MILLIGRAM(S): at 07:38

## 2024-02-23 RX ADMIN — Medication 324 MILLIGRAM(S): at 04:26

## 2024-02-23 RX ADMIN — Medication 15 MILLIGRAM(S): at 05:39

## 2024-02-23 NOTE — ED ADULT NURSE NOTE - CAS EDP DISCH TYPE
Staging Info: By selecting yes to the question above you will include information on AJCC 8 tumor staging in your Mohs note. Information on tumor staging will be automatically added for SCCs on the head and neck. AJCC 8 includes tumor size, tumor depth, perineural involvement and bone invasion. Home

## 2024-02-23 NOTE — ED PROVIDER NOTE - CLINICAL SUMMARY MEDICAL DECISION MAKING FREE TEXT BOX
28-year-old female no past medical history presenting for evaluation of atraumatic left arm cramping burning pain onset 11 PM last night that awoke her from sleep.  Patient is afebrile hemodynamically stable with no focal neurologic deficits on exam, no palpable tenderness, limitations in range of motion or visible rashes.  Compartments are soft.  Patient is neurovascularly intact. no family hx of early cardiac disease or personal hx of cardiac disease. pt denies recent illness.     EKG shows that patient has T wave inversions in lead III, V1, V2 and V3 concerning for anterior ischemia.  EKG otherwise shows a vent rate of 100 bpm, KS interval 168, QRS of 82, QTc of 441, there is no ST elevations or depressions at this time.    Differential diagnosis includes but is not limited to ACS versus musculoskeletal pain versus pinched nerve versus cervical radiculopathy.     Plan to obtain labs including cardiac enzymes, chest x-ray, will keep patient on telemetry monitoring, will treat patient's pain with a lidocaine patch, Tylenol, and will give patient full dose chewable aspirin.  Will consider repeating EKG pending enzyme results.  If patient's pain is not improved, will consider giving patient muscle relaxers such as Valium.  Dispo pending findings.

## 2024-02-23 NOTE — ED ADULT NURSE NOTE - OBJECTIVE STATEMENT
28 year old female, AXOX4, with no significant PMH presents to the ED complaining of left arm pain that began at 11pm. Pain is described as cramping, located  in the shoulder and travels down to the fingers. Pt denies trauma, injury, heavy lifting. Full, pulse, motor and sensory functions intact.  Pt ambulatory with steady gait without assistance. Pt denies chest pain, dyspnea, abd pain, N/V, fever chills. Pt found in gown, breathing unlabored on room air, speaking in complete sentences, strong and equal strength in all extremities, sensations intact, abd soft non tender non distended.

## 2024-02-23 NOTE — ED ADULT NURSE NOTE - DRUG PRE-SCREENING (DAST -1)
Detail Level: Generalized Detail Level: Detailed Detail Level: Simple Nicotinamide Supplementation Recommendations: Continue 500 mg nicotinamide BID Sunscreen Recommendations: continue spf 30 + daily, hats, long sleeves Detail Level: Zone Statement Selected

## 2024-02-23 NOTE — ED ADULT NURSE NOTE - NSFALLUNIVINTERV_ED_ALL_ED
Bed/Stretcher in lowest position, wheels locked, appropriate side rails in place/Call bell, personal items and telephone in reach/Instruct patient to call for assistance before getting out of bed/chair/stretcher/Non-slip footwear applied when patient is off stretcher/Lena to call system/Physically safe environment - no spills, clutter or unnecessary equipment/Purposeful proactive rounding/Room/bathroom lighting operational, light cord in reach

## 2024-02-23 NOTE — ED ADULT TRIAGE NOTE - CHIEF COMPLAINT QUOTE
pain left neck to to fingers began about 4 hours ago spontaneously; feels cramping; no traumas; no numbness or tingling;

## 2024-02-23 NOTE — ED PROVIDER NOTE - CARE PROVIDER_API CALL
Xavier Christianson  Cardiovascular Disease  [address], [address]  [place] [address]  Phone: [phone]  Fax: [phone]  Follow Up Time: Urgent

## 2024-02-23 NOTE — ED PROVIDER NOTE - IV ALTEPLASE EXCL ABS HIDDEN
Gynecology Medications  Protocol Criteria:  · Appointment scheduled in the past 12 months or the next 3 months  · Pap smear in the past 12 months  · Pap smear WNL manually verified  Recent Outpatient Visits            2 months ago Wellness examination    E show

## 2024-02-23 NOTE — ED PROVIDER NOTE - NSPTACCESSSVCSAPPTDETAILS_ED_ALL_ED_FT
sports medicine aND cards for eval of left arm pain and abnormal ekg changes cards for eval of left arm pain and abnormal ekg changes  Pt requesting Dr. Christianson

## 2024-02-23 NOTE — ED PROVIDER NOTE - PROGRESS NOTE DETAILS
Missy Mead MD, PGY2:  Reviewed results of labs and imaging, patient's white count noted to be 16.4, CMP nonactionable, cardiac enzymes are negative for ischemia, CK is 90, UA is positive for blood with 4 RBCs, patient denies being on her menstrual cycle at this time. denies flank pain and abdominal pain. Chest x-ray shows clear lungs.  Informed patient of all findings on labs and imaging.  Patient reassessed at bedside, reports some improvement in her pain but not complete resolution.  Will give Toradol and Valium now. Missy Mead MD, PGY2: WBC 16, will obtain DVT study to eval for DVT. Attending note (Ty): patient endorsed to me at sign-out: 28F p/w LUE pain; no CP/SOB, not febrile; no deformities, no rashes, no swelling/erythema of chest/LUE; soft compartments, neurovascularly intact per report; labs notabelf or leukocytosis of unclear etiology, and CXR shows clear lungs; currently in US for [facility] duplex to eval for DVT/etc; will repeat cbc and troponin (initial < 6) and reassess. Repeat CBC with downtrending leukocytosis, repeat trop negative. Pt pending US results. Called rads, directed to Attg Radiologist Dr. Martinez, no answer on extension, msg sent via Teams, awaiting response. - Aida Hathaway PA-C Spoke with radiology admin Miladis, reports she spoke with Dr. Martinez who is dictating pt's study now. - MICHAEL NavasC DVT study negative. Pt reports she currently has no pain. Reports she has a PCP to f/u with, also requesting cards referral to Dr. Christianson, will provide information upon discharge. Most recent VSS. - Aida Hathaway PA-C

## 2024-02-23 NOTE — ED PROVIDER NOTE - NSFOLLOWUPINSTRUCTIONS_ED_ALL_ED_FT
You were seen in the emergency room for left arm pain.  You were found to have an abnormal changes on your EKG.  We obtained blood work and imaging which showed a white blood cell count of 16, and some blood in your urine.  All other lab results were within normal limits.  You do not have any evidence of elevated cardiac enzymes on your blood tests at this time.  Your chest x-ray was clear.  We gave you medications with improvement in your pain.    Recommend that you follow-up with your primary care physician, sports medicine and cardiologist this week for further investigation into your symptoms.    We contacted our referral coordinator to reach out to you over the next couple of business days to help you arrange an outpatient appointment with radiology.    Please continue to take Tylenol and ibuprofen for your symptoms.    Please return to emergency room if you develop any new or worsening symptoms. You were seen in the emergency room for left arm pain.  You were found to have an abnormal changes on your EKG.  We obtained blood work and imaging which showed a white blood cell count of 16, and some blood in your urine.  All other lab results were within normal limits.  You do not have any evidence of elevated cardiac enzymes on your blood tests at this time.  Your chest x-ray was clear.  Your ultrasound showed:    We gave you medications with improvement in your pain.     Recommend that you follow-up with your primary care physician, sports medicine and cardiologist this week for further investigation into your symptoms.    We contacted our referral coordinator to reach out to you over the next couple of business days to help you arrange an outpatient appointment with radiology.    Please continue to take Tylenol and ibuprofen for your symptoms.    Please return to emergency room if you develop any new or worsening symptoms. You were seen in the emergency room for left arm pain.  You were found to have an abnormal change on your EKG.  We obtained blood work and imaging which showed an elevated white blood cell count that improved on repeat blood work, and some blood in your urine.  All other lab results were within normal limits.  You do not have any evidence of elevated cardiac enzymes on your blood tests at this time.  Your chest x-ray was clear.  Your ultrasound was negative for a blood clot.     We gave you medications with improvement in your pain.     Recommend that you follow-up with your primary care physician, sports medicine and cardiologist this week for further investigation into your symptoms.    We contacted our referral coordinator to reach out to you over the next couple of business days to help you arrange an outpatient appointment with Cardiology.    Please continue to take Tylenol and ibuprofen for your symptoms.  May keep lidocain patch on for up to 12 hours.   Over the Counter patches: Salonpas    Please return to emergency room if you develop any new or worsening symptoms.

## 2024-02-23 NOTE — ED PROVIDER NOTE - ATTENDING CONTRIBUTION TO CARE
Patient presents with isolated pain to her left arm started suddenly several hours prior to arrival, denies any trauma, denies any chest pain, shortness of breath.  She describes the pain as "burning", involving the entire arm, not made worse or better by movement.  On exam patient looks uncomfortable due to pain, is neurologically intact, full range of motion of that arm, no obvious deformity, no soft tissue swelling, soft compartments throughout, neurovascularly intact on that arm with palpable pulses, good perfusion.  Patient has no risk factors for cardiac disease but given atypical pain, EKG and cardiac enzymes and chest x-ray were performed which were unremarkable.  With this in mind, it is highly unlikely that she is having ACS or other acute cardiac pathology.  Patient provided with  analgesics and muscle relaxant for symptomatic relief, with only slight improvement.   clinically no signs of cellulitis or soft tissue abscess. We will obtain a DVT study to ensure this is not contributing to her pain and otherwise should be stable for discharge with supportive care with suspicion for MSK versus neuropathic pain, outpatient follow-up, return precautions

## 2024-02-23 NOTE — ED PROVIDER NOTE - PATIENT PORTAL LINK FT
You can access the FollowMyHealth Patient Portal offered by [facility] by registering at the following website: [email or web address]. By joining [employer]’s FollowMyHealth portal, you will also be able to view your health information using other applications (apps) compatible with our system.

## 2024-02-23 NOTE — ED PROVIDER NOTE - PHYSICAL EXAMINATION
GENERAL: uncomfortable appearing   HEAD: normocephalic, atraumatic  HEENT: normal conjunctiva, oral mucosa moist, uvula midline, no tonsilar exudates, neck supple, no JVD  CARDIAC: regular rate and rhythm, no appreciable murmurs, 2+ pulses in UE/LE b/l  PULM: normal breath sounds, clear to ascultation bilaterally, no rales, rhonchi, wheezing  GI: abdomen nondistended, soft, nontender, no guarding, rebound tenderness  NEURO: no focal motor or sensory deficits, CN2-12 intact, normal speech, PERRLA, EOMI, normal gait, AAOx3  MSK: no midline or paraspinal cervical ttp, no tenderness to palpation of the left trapezius, shoulder, or LUE, sensation in all extremities intact and symmetric, ROM full and nonpainful, strength 5/5 in UE and LE b/l, negative tinels sign, compartments in the LUE are soft   SKIN: well-perfused, extremities warm, no visible rashes  PSYCH: appropriate mood and affect GENERAL: uncomfortable appearing   HEAD: normocephalic, atraumatic  HEENT: normal conjunctiva, oral mucosa moist, neck ROM intact and nonpainful  CARDIAC: regular rate and rhythm, no appreciable murmurs, 2+ pulses in UE b/l, cap refill <2seconds  PULM: normal breath sounds, clear to ascultation bilaterally, no rales, rhonchi, wheezing  GI: abdomen nondistended, soft, nontender, no guarding, rebound tenderness  NEURO: no focal motor or sensory deficits, CN2-12 intact, normal speech, PERRLA, EOMI, normal gait, AAOx3  MSK: no midline or paraspinal cervical ttp, no tenderness to palpation of the left trapezius, shoulder, or LUE, sensation in all extremities intact and symmetric, ROM full and nonpainful, strength 5/5 in UE and LE b/l, negative tinels sign, compartments in the LUE are soft   SKIN: well-perfused, extremities warm, no visible rashes, no erythema or palpable crepitus to the LUE, no ecchymosis, no LUE swelling   PSYCH: appropriate mood and affect

## 2024-03-11 NOTE — ED PROVIDER NOTE - OBJECTIVE STATEMENT
-- DO NOT REPLY / DO NOT REPLY ALL --  -- Message is from Engagement Center [employer] (ECO) --    General Patient Message: Patient needs fmla paperwork dates to be extended feb 20 2024 till through march 12, 2024. Please call patient back once it has been completed.      Caller Information         Type Contact Phone/Fax    03/11/2024 03:28 PM CDT Phone (Incoming) Bhavana Kidd (Self) [phone] (H)          Alternative phone number: n/a    Can a detailed message be left? Yes    Message Turnaround:                28-year-old female no past medical history presenting for evaluation of left arm cramping burning pain onset 11 PM last night that awoke her from sleep.  Patient states the pain starts in the mid trapezius and extends down to the entire arm.  Patient denies any strenuous activity, trauma, pain radiation, exacerbating or relieving factors.  Patient denies chest pain, difficulty breathing, fever, chills, shortness of breath, abdominal pain, nausea, vomiting, dizziness, family history of heart disease, personal history of heart disease.  Patient has not taken any medications for the pain.  Patient denies any rashes, numbness or tingling, difficulty walking, neck pain, headache.

## 2024-06-20 ENCOUNTER — APPOINTMENT (OUTPATIENT)
Dept: OBGYN | Facility: CLINIC | Age: 29
End: 2024-06-20

## 2024-07-04 ENCOUNTER — NON-APPOINTMENT (OUTPATIENT)
Age: 29
End: 2024-07-04

## 2024-07-18 ENCOUNTER — ASOB RESULT (OUTPATIENT)
Age: 29
End: 2024-07-18

## 2024-07-18 ENCOUNTER — APPOINTMENT (OUTPATIENT)
Dept: ANTEPARTUM | Facility: CLINIC | Age: 29
End: 2024-07-18

## 2024-07-18 PROCEDURE — 76813 OB US NUCHAL MEAS 1 GEST: CPT

## 2024-07-18 PROCEDURE — 76801 OB US < 14 WKS SINGLE FETUS: CPT | Mod: 59

## 2024-09-09 ENCOUNTER — ASOB RESULT (OUTPATIENT)
Age: 29
End: 2024-09-09

## 2024-09-09 ENCOUNTER — APPOINTMENT (OUTPATIENT)
Dept: ANTEPARTUM | Facility: CLINIC | Age: 29
End: 2024-09-09
Payer: MEDICAID

## 2024-09-09 PROCEDURE — 76811 OB US DETAILED SNGL FETUS: CPT

## 2025-01-23 ENCOUNTER — INPATIENT (INPATIENT)
Facility: HOSPITAL | Age: 30
LOS: 2 days | Discharge: HOME | End: 2025-01-26
Attending: STUDENT IN AN ORGANIZED HEALTH CARE EDUCATION/TRAINING PROGRAM | Admitting: STUDENT IN AN ORGANIZED HEALTH CARE EDUCATION/TRAINING PROGRAM

## 2025-01-23 VITALS — TEMPERATURE: 99 F | RESPIRATION RATE: 18 BRPM

## 2025-01-23 DIAGNOSIS — O33.5XX0 MATERNAL CARE FOR DISPROPORTION DUE TO UNUSUALLY LARGE FETUS, NOT APPLICABLE OR UNSPECIFIED: ICD-10-CM

## 2025-01-23 LAB
BASOPHILS # BLD AUTO: 0.06 K/UL — SIGNIFICANT CHANGE UP (ref 0–0.2)
BASOPHILS NFR BLD AUTO: 0.5 % — SIGNIFICANT CHANGE UP (ref 0–2)
BLD GP AB SCN SERPL QL: NEGATIVE — SIGNIFICANT CHANGE UP
EOSINOPHIL # BLD AUTO: 0.14 K/UL — SIGNIFICANT CHANGE UP (ref 0–0.5)
EOSINOPHIL NFR BLD AUTO: 1.1 % — SIGNIFICANT CHANGE UP (ref 0–6)
HCT VFR BLD CALC: 34.6 % — SIGNIFICANT CHANGE UP (ref 34.5–45)
HGB BLD-MCNC: 11.3 G/DL — LOW (ref 11.5–15.5)
IANC: 9.95 K/UL — HIGH (ref 1.8–7.4)
IMM GRANULOCYTES NFR BLD AUTO: 0.8 % — SIGNIFICANT CHANGE UP (ref 0–0.9)
LYMPHOCYTES # BLD AUTO: 17.8 % — SIGNIFICANT CHANGE UP (ref 13–44)
LYMPHOCYTES # BLD AUTO: 2.36 K/UL — SIGNIFICANT CHANGE UP (ref 1–3.3)
MCHC RBC-ENTMCNC: 27.4 PG — SIGNIFICANT CHANGE UP (ref 27–34)
MCHC RBC-ENTMCNC: 32.7 G/DL — SIGNIFICANT CHANGE UP (ref 32–36)
MCV RBC AUTO: 83.8 FL — SIGNIFICANT CHANGE UP (ref 80–100)
MONOCYTES # BLD AUTO: 0.66 K/UL — SIGNIFICANT CHANGE UP (ref 0–0.9)
MONOCYTES NFR BLD AUTO: 5 % — SIGNIFICANT CHANGE UP (ref 2–14)
NEUTROPHILS # BLD AUTO: 9.95 K/UL — HIGH (ref 1.8–7.4)
NEUTROPHILS NFR BLD AUTO: 74.8 % — SIGNIFICANT CHANGE UP (ref 43–77)
NRBC # BLD AUTO: 0 K/UL — SIGNIFICANT CHANGE UP (ref 0–0)
NRBC # BLD: 0 /100 WBCS — SIGNIFICANT CHANGE UP (ref 0–0)
NRBC # FLD: 0 K/UL — SIGNIFICANT CHANGE UP (ref 0–0)
NRBC BLD-RTO: 0 /100 WBCS — SIGNIFICANT CHANGE UP (ref 0–0)
PLATELET # BLD AUTO: 293 K/UL — SIGNIFICANT CHANGE UP (ref 150–400)
RBC # BLD: 4.13 M/UL — SIGNIFICANT CHANGE UP (ref 3.8–5.2)
RBC # FLD: 15.9 % — HIGH (ref 10.3–14.5)
RH IG SCN BLD-IMP: POSITIVE — SIGNIFICANT CHANGE UP
WBC # BLD: 13.28 K/UL — HIGH (ref 3.8–10.5)
WBC # FLD AUTO: 13.28 K/UL — HIGH (ref 3.8–10.5)

## 2025-01-23 RX ORDER — SODIUM CHLORIDE 9 G/ML
1000 INJECTION, SOLUTION INTRAVENOUS
Refills: 0 | Status: DISCONTINUED | OUTPATIENT
Start: 2025-01-23 | End: 2025-01-24

## 2025-01-23 RX ORDER — OXYTOCIN/0.9 % SODIUM CHLORIDE 10/500ML
PLASTIC BAG, INJECTION (ML) INTRAVENOUS
Qty: 30 | Refills: 0 | Status: DISCONTINUED | OUTPATIENT
Start: 2025-01-23 | End: 2025-01-24

## 2025-01-23 RX ORDER — ANTISEPTIC SURGICAL SCRUB 0.04 MG/ML
1 SOLUTION TOPICAL DAILY
Refills: 0 | Status: DISCONTINUED | OUTPATIENT
Start: 2025-01-23 | End: 2025-01-24

## 2025-01-23 RX ORDER — OXYTOCIN/0.9 % SODIUM CHLORIDE 10/500ML
167 PLASTIC BAG, INJECTION (ML) INTRAVENOUS
Qty: 30 | Refills: 0 | Status: DISCONTINUED | OUTPATIENT
Start: 2025-01-23 | End: 2025-01-24

## 2025-01-23 RX ADMIN — SODIUM CHLORIDE 125 MILLILITER(S): 9 INJECTION, SOLUTION INTRAVENOUS at 19:23

## 2025-01-23 RX ADMIN — Medication 200 GRAM(S): at 15:34

## 2025-01-23 RX ADMIN — Medication 108 GRAM(S): at 19:30

## 2025-01-23 RX ADMIN — Medication 108 GRAM(S): at 23:30

## 2025-01-23 RX ADMIN — ANTISEPTIC SURGICAL SCRUB 1 APPLICATION(S): 0.04 SOLUTION TOPICAL at 15:12

## 2025-01-23 RX ADMIN — Medication 2 MILLIUNIT(S)/MIN: at 19:23

## 2025-01-23 RX ADMIN — SODIUM CHLORIDE 125 MILLILITER(S): 9 INJECTION, SOLUTION INTRAVENOUS at 15:30

## 2025-01-23 RX ADMIN — Medication 2 MILLIUNIT(S)/MIN: at 16:00

## 2025-01-23 NOTE — OB RN PATIENT PROFILE - FALL HARM RISK - UNIVERSAL INTERVENTIONS
Bed in lowest position, wheels locked, appropriate side rails in place/Call bell, personal items and telephone in reach/Instruct patient to call for assistance before getting out of bed or chair/Non-slip footwear when patient is out of bed/[place] to call system/Physically safe environment - no spills, clutter or unnecessary equipment/Purposeful Proactive Rounding/Room/bathroom lighting operational, light cord in reach

## 2025-01-23 NOTE — OB RN PATIENT PROFILE - PRETERM DELIVERIES, OB PROFILE
09/14/23 1013   Anesthesia PAT Clearance   Anesthesia Review Status Anes has reviewed patient for surgery  (Dr. Terri Mckeon reviewed labs and history with no further intervention)
0

## 2025-01-23 NOTE — OB PROVIDER H&P - HISTORY OF PRESENT ILLNESS
Pt is a 29y y.o  @ 39w6d presenting to L&D for scheduled eIOL for. Patient denies contractions, LOF, VB, CP SOB, fever chills. Endorses +FM. Category 1 tracing.    PNC per MD Chetan KWONG course c/b:  -GBS pos    Allergies: NKDA  Meds: PNV, iron, ASA Pt is a 29y y.o  @ 39w6d presenting to L&D for scheduled eIOL. Patient denies contractions, LOF, VB, CP SOB, fever chills. Endorses +FM. Category 1 tracing.    PNC per MD Chetan KWONG course c/b:  -GBS pos    Allergies: NKDA  Meds: PNV, iron, ASA

## 2025-01-23 NOTE — OB PROVIDER H&P - ASSESSMENT
Pt is a 29 y.o  @ 39w6d presenting for scheduled eIOL. GBS pos. Cat I tracing.    -Admit to L&D  -Routine labs ordered: CBC, T&S, RPR  -Amp for GBS prophylaxis  -IV fluids  -clear liquid diet  -Blood transfusion and induction consents obtained  -pain management PRN  -Plan for IOL with pitocin    Blood transfusion and induction/labor consents obtained. Risks, benefits, alternatives and possible complications have been discussed in detail with the patient in her native language. Pre-admission, admission, and post admission procedures and expectations were discussed in detail. All questions answered, all appropriate hospital consents were signed. Anticipate normal vaginal delivery. Informed Consent was obtained. The following was discussed:     Induction/Augmentation of Labor: Use of medication and/or cook balloon to begin or enhance labor  Obstetrical management including internal fetal/contraction monitoring  Normal vaginal delivery  Possible  Section: (surgical cut in the abdomen in order to deliver the baby)    d/w Dr. Chetan Sofia NP

## 2025-01-23 NOTE — OB PROVIDER H&P - NSLOWPPHRISK_OBGYN_A_OB
No previous uterine incision/[place] Pregnancy/Less than or equal to 4 previous vaginal births/No known bleeding disorder/No history of postpartum hemorrhage

## 2025-01-23 NOTE — OB PROVIDER H&P - NSHPPHYSICALEXAM_GEN_ALL_CORE
T(C): 37.2 (01-23-25 @ 14:40), Max: 37.2 (01-23-25 @ 14:40)  HR: 81 (01-23-25 @ 14:41) (81 - 81)  BP: 116/71 (01-23-25 @ 14:41) (116/71 - 116/71)  RR: 18 (01-23-25 @ 14:40) (18 - 18)    Physical Exam  Gen: NAD  Cardiac: RRR  Pulm: Unlabored, breathing comfortably on room air  Abdomen: soft, nontender, gravid    [place]: cephalic presentation  VE: 3/50/-3  EFW: 3800g  EFM: Baseline 140/Mod variability/accels present/decels absent  [place]: irregular contractions

## 2025-01-24 LAB — T PALLIDUM AB TITR SER: NEGATIVE — SIGNIFICANT CHANGE UP

## 2025-01-24 RX ORDER — BUTALB/ACETAMINOPHEN/CAFFEINE 50-325-40
1 TABLET ORAL EVERY 6 HOURS
Refills: 0 | Status: DISCONTINUED | OUTPATIENT
Start: 2025-01-24 | End: 2025-01-26

## 2025-01-24 RX ORDER — OXYCODONE HYDROCHLORIDE 30 MG/1
5 TABLET ORAL ONCE
Refills: 0 | Status: DISCONTINUED | OUTPATIENT
Start: 2025-01-24 | End: 2025-01-26

## 2025-01-24 RX ORDER — MAGNESIUM HYDROXIDE 400 MG/5ML
30 SUSPENSION, ORAL (FINAL DOSE FORM) ORAL
Refills: 0 | Status: DISCONTINUED | OUTPATIENT
Start: 2025-01-24 | End: 2025-01-26

## 2025-01-24 RX ORDER — OXYTOCIN/0.9 % SODIUM CHLORIDE 10/500ML
167 PLASTIC BAG, INJECTION (ML) INTRAVENOUS
Qty: 30 | Refills: 0 | Status: DISCONTINUED | OUTPATIENT
Start: 2025-01-24 | End: 2025-01-25

## 2025-01-24 RX ORDER — PNV WITH CALCIUM NO.11/IRON/FA 65 MG-1 MG
1 TABLET ORAL DAILY
Refills: 0 | Status: DISCONTINUED | OUTPATIENT
Start: 2025-01-24 | End: 2025-01-26

## 2025-01-24 RX ORDER — KETOROLAC TROMETHAMINE 10 MG
30 TABLET ORAL ONCE
Refills: 0 | Status: DISCONTINUED | OUTPATIENT
Start: 2025-01-24 | End: 2025-01-24

## 2025-01-24 RX ORDER — BACTERIOSTATIC SODIUM CHLORIDE 0.9 %
3 VIAL (ML) INJECTION EVERY 8 HOURS
Refills: 0 | Status: DISCONTINUED | OUTPATIENT
Start: 2025-01-24 | End: 2025-01-26

## 2025-01-24 RX ORDER — ACETAMINOPHEN 160 MG/5ML
975 SUSPENSION ORAL
Refills: 0 | Status: DISCONTINUED | OUTPATIENT
Start: 2025-01-24 | End: 2025-01-24

## 2025-01-24 RX ORDER — CLOSTRIDIUM TETANI TOXOID ANTIGEN (FORMALDEHYDE INACTIVATED), CORYNEBACTERIUM DIPHTHERIAE TOXOID ANTIGEN (FORMALDEHYDE INACTIVATED), BORDETELLA PERTUSSIS TOXOID ANTIGEN (GLUTARALDEHYDE INACTIVATED), BORDETELLA PERTUSSIS FILAMENTOUS HEMAGGLUTININ ANTIGEN (FORMALDEHYDE INACTIVATED), BORDETELLA PERTUSSIS PERTACTIN ANTIGEN, AND BORDETELLA PERTUSSIS FIMBRIAE 2/3 ANTIGEN 5; 2; 2.5; 5; 3; 5 [LF]/.5ML; [LF]/.5ML; UG/.5ML; UG/.5ML; UG/.5ML; UG/.5ML
0.5 INJECTION, SUSPENSION INTRAMUSCULAR ONCE
Refills: 0 | Status: DISCONTINUED | OUTPATIENT
Start: 2025-01-24 | End: 2025-01-26

## 2025-01-24 RX ORDER — DIPHENHYDRAMINE HCL 25 MG
25 CAPSULE ORAL EVERY 6 HOURS
Refills: 0 | Status: DISCONTINUED | OUTPATIENT
Start: 2025-01-24 | End: 2025-01-26

## 2025-01-24 RX ORDER — IBUPROFEN 600 MG/1
600 TABLET, FILM COATED ORAL EVERY 6 HOURS
Refills: 0 | Status: COMPLETED | OUTPATIENT
Start: 2025-01-24 | End: 2025-12-23

## 2025-01-24 RX ORDER — HYDROCORTISONE 1 %
1 CREAM (GRAM) TOPICAL EVERY 6 HOURS
Refills: 0 | Status: DISCONTINUED | OUTPATIENT
Start: 2025-01-24 | End: 2025-01-26

## 2025-01-24 RX ORDER — IBUPROFEN 600 MG/1
600 TABLET, FILM COATED ORAL EVERY 6 HOURS
Refills: 0 | Status: DISCONTINUED | OUTPATIENT
Start: 2025-01-24 | End: 2025-01-26

## 2025-01-24 RX ORDER — OXYCODONE HYDROCHLORIDE 30 MG/1
5 TABLET ORAL
Refills: 0 | Status: DISCONTINUED | OUTPATIENT
Start: 2025-01-24 | End: 2025-01-26

## 2025-01-24 RX ORDER — WITCH HAZEL 86 ML/100ML
1 OIL ORAL; TOPICAL EVERY 4 HOURS
Refills: 0 | Status: DISCONTINUED | OUTPATIENT
Start: 2025-01-24 | End: 2025-01-26

## 2025-01-24 RX ORDER — PRAMOXINE HCL 1 %
1 CREAM (GRAM) RECTAL EVERY 4 HOURS
Refills: 0 | Status: DISCONTINUED | OUTPATIENT
Start: 2025-01-24 | End: 2025-01-26

## 2025-01-24 RX ADMIN — ACETAMINOPHEN 975 MILLIGRAM(S): 160 SUSPENSION ORAL at 05:27

## 2025-01-24 RX ADMIN — Medication 1 TABLET(S): at 22:20

## 2025-01-24 RX ADMIN — Medication 167 MILLIUNIT(S)/MIN: at 01:58

## 2025-01-24 RX ADMIN — Medication 1 TABLET(S): at 14:56

## 2025-01-24 RX ADMIN — ACETAMINOPHEN 975 MILLIGRAM(S): 160 SUSPENSION ORAL at 06:29

## 2025-01-24 RX ADMIN — Medication 30 MILLIGRAM(S): at 02:30

## 2025-01-24 RX ADMIN — Medication 3 MILLILITER(S): at 05:54

## 2025-01-24 RX ADMIN — Medication 1 TABLET(S): at 22:50

## 2025-01-24 RX ADMIN — IBUPROFEN 600 MILLIGRAM(S): 600 TABLET, FILM COATED ORAL at 18:01

## 2025-01-24 RX ADMIN — IBUPROFEN 600 MILLIGRAM(S): 600 TABLET, FILM COATED ORAL at 19:00

## 2025-01-24 RX ADMIN — Medication 30 MILLIGRAM(S): at 03:02

## 2025-01-24 RX ADMIN — Medication 1 TABLET(S): at 15:50

## 2025-01-24 NOTE — OB RN DELIVERY SUMMARY - NS_SEPSISRSKCALC_OBGYN_ALL_OB_FT
EOS calculated successfully. EOS Risk Factor: 0.5/1000 live births ([facility] national incidence); GA=40w;Temp=99; ROM=2; GBS='Positive'; Antibiotics='GBS specific antibiotics > 2 hrs prior to birth'

## 2025-01-24 NOTE — OB PROVIDER DELIVERY SUMMARY - NSLOWPPHRISK_OBGYN_A_OB
I would recommend sticking to a bland diet and avoiding dairy.  Please call and schedule a follow-up appoint with your primary care doctor  Please return to the emergency room if your symptoms worsen or if you develop any high fevers or worsening abdominal pain  
No previous uterine incision/[place] Pregnancy/Less than or equal to 4 previous vaginal births/No known bleeding disorder/No history of postpartum hemorrhage

## 2025-01-24 NOTE — OB PROVIDER DELIVERY SUMMARY - NSSELHIDDEN_OBGYN_ALL_OB_FT
[NS_DeliveryAttending1_OBGYN_ALL_OB_FT:[id number]],[NS_DeliveryAssist1_OBGYN_ALL_OB_FT:[id number]=]

## 2025-01-24 NOTE — OB NEONATOLOGY/PEDIATRICIAN DELIVERY SUMMARY - NSPEDSNEONOTESA_OBGYN_ALL_OB_FT
39.6wk M born AGA via  to a 29y/o  blood type O+ mother. Maternal history of anemia. Prenatal history of IOL for suspected LGA. PNL HIV -/Hep B-/RPR non-reactive/Rubella immune, GBS+ on 12/10, received ampicillin. SROM at 23:20 with light mec fluids. PEDS called for light mec fluids. Baby emerged vigorous, crying, was w/d/s/s with APGARS of 8/9. CPAP initiated at 9MOL for color change, continued for 10 minutes with improved O2 saturations. Deep suction x3 yielding clear fluids. Mom plans to initiate breastfeeding/formula feeding, consents to/declines Hep B vaccine, and consents to/declines circ. Highest maternal temp 37.2C with EOS of 0.07.     : 26  TOB: 01:20  Birth weight: 3790g    Physical Exam:  Gen: No acute distress, +grimace  HEENT:  Anterior fontanel open soft and flat, nondysmorphic facies, no cleft lip/palate, ears normal set, no ear pits or tags, nares clinically patent  Resp: Normal respiratory effort without grunting or retractions, good air entry b/l, clear to auscultation bilaterally  Cardio: Present S1/S2, regular rate and rhythm, no murmurs  Abd: soft, non tender, non distended, umbilical cord with 3 vessels  Neuro: +palmar and plantar grasp, +suck, +Gloria, normal tone  Extremities: negative Manzo and Ortolani maneuvers, moving all extremities, no clavicular crepitus or stepoff  Skin: Pink, warm  Genitals: Normal male anatomy, testicles palpable in scrotum b/l, Wilbur 1, anus patent

## 2025-01-24 NOTE — OB RN DELIVERY SUMMARY - NSBEGANLABOR_OBGYN_A_OB
Return to work with no restrictions  Return here only if needed    Thank you for choosing [facility] for your healthcare needs.    We strive to provide you with excellent service and hope that we have exceeded your expectations.     If you receive a survey in the mail, we hope that you will complete it and agree that we have provided \"Very Good\" care today.  If you would like to speak to us about your visit, please contact our center at:    [facility]  Telephone [phone]  [facility]  Telephone [phone]  [facility]  Telephone [phone]    _____________________    
While in Labor & Delivery

## 2025-01-24 NOTE — PROGRESS NOTE ADULT - SUBJECTIVE AND OBJECTIVE BOX
Post-partum Note,   She is a  29y woman who is now post-partum day: 0    Subjective:  The patient feels well.  She is ambulating.   She is tolerating regular diet.  She denies nausea and vomiting; denies fever.  She is voiding.  Her pain is controlled.  She reports normal postpartum bleeding.  She is breastfeeding and formula feeding.    Physical exam:    Vital Signs Last 24 Hrs  T(C): 36.3 (2025 09:15), Max: 37.2 (2025 14:40)  T(F): 97.4 (2025 09:15), Max: 99 (2025 16:07)  HR: 92 (2025 09:15) (70 - 119)  BP: 107/75 (2025 09:15) (97/54 - 125/77)  BP(mean): --  RR: 16 (2025 09:15) (15 - 19)  SpO2: 99% (2025 09:15) (83% - 100%)    Parameters below as of 2025 16:07  Patient On (Oxygen Delivery Method): room air        Gen: NAD  Breast: Soft, nontender, not engorged.  Abdomen: Soft, nontender, no distension , firm uterine fundus at umbilicus.  Pelvic: Normal lochia noted  Ext: No calf tenderness    LABS:                        11.3   13.28 )-----------( 293      ( 2025 15:45 )             34.6     ABO Interpretation: O ( @ 15:39)  Rh Interpretation: Positive ( @ 15:39)  Antibody Screen: Negative ( @ 15:39)      Allergies    Allergy to cat (Sneezing)  No Known Drug Allergies      MEDICATIONS  (STANDING):  acetaminophen     Tablet .. 975 milliGRAM(s) Oral <User Schedule>  diphtheria/tetanus/pertussis (acellular) Vaccine (Adacel) 0.5 milliLiter(s) IntraMuscular once  ibuprofen  Tablet. 600 milliGRAM(s) Oral every 6 hours  oxytocin Infusion 167 milliUNIT(s)/Min (167 mL/Hr) IV Continuous <Continuous>  prenatal multivitamin 1 Tablet(s) Oral daily  sodium chloride 0.9% lock flush 3 milliLiter(s) IV Push every 8 hours    MEDICATIONS  (PRN):  benzocaine 20%/menthol 0.5% Spray 1 Spray(s) Topical every 6 hours PRN for Perineal discomfort  dibucaine 1% Ointment 1 Application(s) Topical every 6 hours PRN Perineal discomfort  diphenhydrAMINE 25 milliGRAM(s) Oral every 6 hours PRN Pruritus  hydrocortisone 1% Cream 1 Application(s) Topical every 6 hours PRN Moderate Pain (4-6)  lanolin Ointment 1 Application(s) Topical every 6 hours PRN nipple soreness  magnesium hydroxide Suspension 30 milliLiter(s) Oral two times a day PRN Constipation  oxyCODONE    IR 5 milliGRAM(s) Oral every 3 hours PRN Moderate to Severe Pain (4-10)  oxyCODONE    IR 5 milliGRAM(s) Oral once PRN Moderate to Severe Pain (4-10)  pramoxine 1%/zinc 5% Cream 1 Application(s) Topical every 4 hours PRN Moderate Pain (4-6)  simethicone 80 milliGRAM(s) Chew every 4 hours PRN Gas  witch hazel Pads 1 Application(s) Topical every 4 hours PRN Perineal discomfort

## 2025-01-24 NOTE — OB PROVIDER DELIVERY SUMMARY - NSPROVIDERDELIVERYNOTE_OBGYN_ALL_OB_FT
Patient was found to be fully dilated and directed to push with contractions.  Spontaneous vaginal delivery of liveborn male.  Head, shoulders and body delivered easily. Nuchal x1.  Cord was delayed 1 minute. Cord was cut.  Infant was passed to mother.  Placenta delivered spontaneously intact. Uterine massage was performed and pitocin was given.  Vaginal walls, sulci, and cervix examined and noted to be intact.  Fundus was firm. First degree laceration, no bleeding noted and no sutures needed.  Good hemostasis was noted.  Count correct x2.       Afua Oneal, PGY3

## 2025-01-25 ENCOUNTER — TRANSCRIPTION ENCOUNTER (OUTPATIENT)
Age: 30
End: 2025-01-25

## 2025-01-25 RX ORDER — WITCH HAZEL 86 ML/100ML
1 OIL ORAL; TOPICAL
Qty: 0 | Refills: 0 | DISCHARGE
Start: 2025-01-25

## 2025-01-25 RX ORDER — PNV WITH CALCIUM NO.11/IRON/FA 65 MG-1 MG
1 TABLET ORAL
Qty: 0 | Refills: 0 | DISCHARGE
Start: 2025-01-25

## 2025-01-25 RX ADMIN — Medication 1 TABLET(S): at 15:40

## 2025-01-25 RX ADMIN — Medication 1 TABLET(S): at 14:44

## 2025-01-25 RX ADMIN — Medication 1 TABLET(S): at 08:48

## 2025-01-25 RX ADMIN — Medication 1 TABLET(S): at 03:24

## 2025-01-25 RX ADMIN — IBUPROFEN 600 MILLIGRAM(S): 600 TABLET, FILM COATED ORAL at 00:34

## 2025-01-25 RX ADMIN — IBUPROFEN 600 MILLIGRAM(S): 600 TABLET, FILM COATED ORAL at 00:04

## 2025-01-25 RX ADMIN — Medication 1 TABLET(S): at 09:40

## 2025-01-25 RX ADMIN — IBUPROFEN 600 MILLIGRAM(S): 600 TABLET, FILM COATED ORAL at 06:27

## 2025-01-25 RX ADMIN — Medication 1 TABLET(S): at 22:16

## 2025-01-25 RX ADMIN — Medication 1 TABLET(S): at 21:25

## 2025-01-25 RX ADMIN — IBUPROFEN 600 MILLIGRAM(S): 600 TABLET, FILM COATED ORAL at 06:57

## 2025-01-25 RX ADMIN — Medication 1 TABLET(S): at 03:54

## 2025-01-25 NOTE — DISCHARGE NOTE OB - CARE PLAN
Principal Discharge DX:	 (normal spontaneous vaginal delivery)  Assessment and plan of treatment:	26   Male  QBL: 107   routine pp care   1

## 2025-01-25 NOTE — DISCHARGE NOTE OB - FINANCIAL ASSISTANCE
[facility] provides services at a reduced cost to those who are determined to be eligible through [facility]’s financial assistance program. Information regarding [facility]’s financial assistance program can be found by going to [email or web address] or by calling [phone].

## 2025-01-25 NOTE — PROGRESS NOTE ADULT - SUBJECTIVE AND OBJECTIVE BOX
This is a followup note regarding PDPH (post dural puncture headache).   Patient states that her headache is better today. She takes fioricet which gives her relief.  Bloodpatch also discussed. Risk and benefits explained. Patient prefers to be on conservative therapy at this time, with headache   progressively getting better. Patient was told if the headache worsens to call his team, and if from home to go back to [facility] OB Triage directly or Emergency Department.

## 2025-01-25 NOTE — DISCHARGE NOTE OB - PATIENT PORTAL LINK FT
You can access the FollowMyHealth Patient Portal offered by [facility] by registering at the following website: [email or web address]. By joining [employer]’s FollowMyHealth portal, you will also be able to view your health information using other applications (apps) compatible with our system.

## 2025-01-25 NOTE — PROGRESS NOTE ADULT - SUBJECTIVE AND OBJECTIVE BOX
She is a 29y woman who is now post-partum day: 1    Subjective:  The patient overall feels well. Continues to c/o a spinal h/a.  She is ambulating.   She is tolerating regular diet.  She denies nausea and vomiting; denies fever.  She is voiding.  Her pain is controlled.  She reports normal postpartum bleeding.  She is breastfeeding.  She is formula feeding.  She is bonding with infant.    Physical exam:    Vital Signs Last 24 Hrs  T(C): 36.8 (25 Jan 2025 06:19), Max: 36.8 (25 Jan 2025 06:19)  T(F): 98.2 (25 Jan 2025 06:19), Max: 98.2 (25 Jan 2025 06:19)  HR: 69 (25 Jan 2025 06:19) (69 - 79)  BP: 100/76 (25 Jan 2025 06:19) (100/76 - 113/72)  BP(mean): --  RR: 18 (25 Jan 2025 06:19) (16 - 18)  SpO2: 99% (25 Jan 2025 06:19) (98% - 100%)    Parameters below as of 25 Jan 2025 06:19  Patient On (Oxygen Delivery Method): room air        Gen: NAD  Breast: Soft, nontender, not engorged.  Abdomen: Soft, nontender, no distension , firm uterine fundus at umbilicus.  Pelvic: Normal lochia noted  Ext: No calf tenderness    LABS:                        11.3   13.28 )-----------( 293      ( 23 Jan 2025 15:45 )             34.6       Rubella status:     Allergies    Allergy to cat (Sneezing)  No Known Drug Allergies    Intolerances      MEDICATIONS  (STANDING):  acetaminophen 325 mG/butalbital 50 mG/caffeine 40 mG 1 Tablet(s) Oral every 6 hours  diphtheria/tetanus/pertussis (acellular) Vaccine (Adacel) 0.5 milliLiter(s) IntraMuscular once  ibuprofen  Tablet. 600 milliGRAM(s) Oral every 6 hours  prenatal multivitamin 1 Tablet(s) Oral daily  sodium chloride 0.9% lock flush 3 milliLiter(s) IV Push every 8 hours    MEDICATIONS  (PRN):  benzocaine 20%/menthol 0.5% Spray 1 Spray(s) Topical every 6 hours PRN for Perineal discomfort  dibucaine 1% Ointment 1 Application(s) Topical every 6 hours PRN Perineal discomfort  diphenhydrAMINE 25 milliGRAM(s) Oral every 6 hours PRN Pruritus  hydrocortisone 1% Cream 1 Application(s) Topical every 6 hours PRN Moderate Pain (4-6)  lanolin Ointment 1 Application(s) Topical every 6 hours PRN nipple soreness  magnesium hydroxide Suspension 30 milliLiter(s) Oral two times a day PRN Constipation  oxyCODONE    IR 5 milliGRAM(s) Oral every 3 hours PRN Moderate to Severe Pain (4-10)  oxyCODONE    IR 5 milliGRAM(s) Oral once PRN Moderate to Severe Pain (4-10)  pramoxine 1%/zinc 5% Cream 1 Application(s) Topical every 4 hours PRN Moderate Pain (4-6)  simethicone 80 milliGRAM(s) Chew every 4 hours PRN Gas  witch hazel Pads 1 Application(s) Topical every 4 hours PRN Perineal discomfort

## 2025-01-25 NOTE — DISCHARGE NOTE OB - MEDICATION SUMMARY - MEDICATIONS TO TAKE
I will START or STAY ON the medications listed below when I get home from the hospital:    ibuprofen 600 mg oral tablet  -- 1 tab(s) by mouth every 6 hours  -- Indication: For pain    acetaminophen 325 mg oral tablet  -- 3 tab(s) by mouth   -- Indication: For pain    benzocaine 20% topical spray  -- 1 Apply on skin to affected area every 6 hours As needed for Perineal discomfort  -- Indication: For perineal pain    witch hazel 50% topical pad  -- 1 Apply on skin to affected area every 4 hours As needed Perineal discomfort  -- Indication: For perineal pain    Prenatal Multivitamins with Folic Acid 1 mg oral tablet  -- 1 tab(s) by mouth once a day  -- Indication: For postpartum

## 2025-01-25 NOTE — DISCHARGE NOTE OB - CARE PROVIDERS DIRECT ADDRESSES
[email or web address]

## 2025-01-25 NOTE — DISCHARGE NOTE OB - CARE PROVIDER_API CALL
Andreina Brown  Obstetrics and Gynecology  [address] [place] [phone]  Phone: [phone]  Follow Up Time:

## 2025-01-26 VITALS
TEMPERATURE: 98 F | OXYGEN SATURATION: 98 % | DIASTOLIC BLOOD PRESSURE: 66 MMHG | SYSTOLIC BLOOD PRESSURE: 102 MMHG | RESPIRATION RATE: 17 BRPM | HEART RATE: 65 BPM

## 2025-01-26 RX ADMIN — IBUPROFEN 600 MILLIGRAM(S): 600 TABLET, FILM COATED ORAL at 11:37

## 2025-01-26 RX ADMIN — IBUPROFEN 600 MILLIGRAM(S): 600 TABLET, FILM COATED ORAL at 01:15

## 2025-01-26 RX ADMIN — IBUPROFEN 600 MILLIGRAM(S): 600 TABLET, FILM COATED ORAL at 05:44

## 2025-01-26 RX ADMIN — IBUPROFEN 600 MILLIGRAM(S): 600 TABLET, FILM COATED ORAL at 00:09

## 2025-01-26 RX ADMIN — Medication 1 TABLET(S): at 11:38

## 2025-01-26 RX ADMIN — Medication 30 MILLILITER(S): at 11:47

## 2025-01-26 RX ADMIN — IBUPROFEN 600 MILLIGRAM(S): 600 TABLET, FILM COATED ORAL at 06:32

## 2025-01-26 RX ADMIN — Medication 1 TABLET(S): at 11:37

## 2025-01-26 NOTE — PROGRESS NOTE ADULT - SUBJECTIVE AND OBJECTIVE BOX
NP: CHELSI day 2 Progress Note:     Patient seen at bedside resting comfortably offers no current complaints. Ambulating and voiding without difficulty.  Passing flatus and tolerating regular diet.  Bonding well with .  Breastfeeding exclusively . Denies HA, CP, SOB, N/V/D, dizziness, palpitations,  worsening vaginal bleeding, or any other concerns.      Vital Signs Last 24 Hrs  T(C): 36.3 (2025 05:54), Max: 37.2 (2025 18:28)  T(F): 97.3 (2025 05:54), Max: 99 (2025 18:28)  HR: 65 (2025 05:54) (65 - 93)  BP: 120/74 (2025 05:54) (102/59 - 120/74)  BP(mean): --  RR: 18 (2025 05:54) (17 - 18)  SpO2: 100% (2025 05:54) (100% - 100%)    Parameters below as of 2025 05:54  Patient On (Oxygen Delivery Method): room air        Physical Exam:     Gen: A&Ox 3, NAD  Chest: CTA B/L  Cardiac: S1,S2; RRR  Breast: Soft, nontender, nonengorged  Abdomen: +BS, Soft, nontender, ND; Fundus firm below umbilicus  Gyn: mod lochia, intact/repaired  Ext: Nontender, DTRS 2+, no worsening edema

## 2025-01-26 NOTE — PROGRESS NOTE ADULT - ASSESSMENT
Anesthesia Post-op Note    POD#1 S/P     Patient s/p wet tap states she has an unbearable headache. Patient educated on epidural blood patch procedure. Patient states she would like time to decide if she wants the procedure performed. Patient currently being treated conservatively with tylenol and caffeine. Dr. Kelsey from anesthesia informed and will follow up with patient.  
Anesthesia Post-op Note    POD#1 S/P     Patient is doing well.  OOBAA. Tolerating clears.  Pain is tolerable.  No residual anesthetic issues or complications noted.  
Assessment and Plan  PPD #2 s/p .     #spinal HA   -Pt reports Headache has had some improvement but still has headache when sitting up   - S/p anesthesia consult  - cont Esgic    - Patient prefers to be on conservative therapy at this time  -Per anesthesia note: " Patient was told if the headache worsens to call his team, and if from home to go back to [facility] OB Triage directly or Emergency Department."  -Discussed with Chetan VIERA: plan for D/C today       #Post Partum  Her pain is well controlled.   She is tolerating a regular diet and passing flatus.   Denies N/V. Denies CP/SOB/lightheadedness/dizziness.   She is ambulating without difficulty.   Voiding spontaneously.    Patient is stable and doing well postpartum  - Continue regular diet.  - Increase ambulation.  - Continue motrin, tylenol, oxycodone PRN for pain control.   -Encourage breastfeeding.    -PP educational material reviewed and provided.  -PP & PPD Instructions reviewed.    -Discharge planning>>>D/C home     -Follow up @ [facility] in 6 weeks for postpartum visit  [phone]    Discussed with  MD Chetan DIAS   
28 yo PPD #1 s/p . Spinal H/A. QBL: 107ml.     Spinal H/A:  -Confirmed by Anesthesia  -Declined blood patch yesterday. Requesting one now.  -Vital signs stable  -Anesthesia made aware of the pts change of plan and will consult  -Increased PO hydration, PO caffiene intact and pain relieg measures discussed. \  -Will continue to monitor.     Overall doing well postpartum  Increase ambulation.  Encourage breastfeeding.  Continue taking PO pain meds PRN    PP & PPD Instructions reviewed.  PP educational materials reviewed & provided       Discussed with Dr. Stanton Kc LUÍS-BC
Assessment and Plan  PPD #0 s/p   Doing well, bonding with baby, support at bedside  postdural HA  ·	seen by anesthesia  ·	continue to lay flat  ·	continue tylenol prn pain  ·	continue to increase po hydration  ·	consider blood patch if symptoms worsen or persist  Encourage ambulation.  PP & PPD Instructions reviewed.  Continue pain management  plan discussed with Dr. JEANNIE Brown

## 2025-01-28 ENCOUNTER — OUTPATIENT (OUTPATIENT)
Dept: INPATIENT UNIT | Facility: HOSPITAL | Age: 30
LOS: 1 days | Discharge: HOME | End: 2025-01-28

## 2025-01-28 ENCOUNTER — APPOINTMENT (OUTPATIENT)
Dept: ANTEPARTUM | Facility: CLINIC | Age: 30
End: 2025-01-28

## 2025-01-28 VITALS
TEMPERATURE: 98 F | OXYGEN SATURATION: 99 % | SYSTOLIC BLOOD PRESSURE: 132 MMHG | HEART RATE: 98 BPM | DIASTOLIC BLOOD PRESSURE: 84 MMHG

## 2025-01-28 VITALS — SYSTOLIC BLOOD PRESSURE: 132 MMHG | HEART RATE: 97 BPM | DIASTOLIC BLOOD PRESSURE: 80 MMHG | OXYGEN SATURATION: 97 %

## 2025-01-28 DIAGNOSIS — O26.899 OTHER SPECIFIED PREGNANCY RELATED CONDITIONS, UNSPECIFIED TRIMESTER: ICD-10-CM

## 2025-01-28 NOTE — PROGRESS NOTE ADULT - SUBJECTIVE AND OBJECTIVE BOX
Patient presents to triage with a postural headache.  She experienced a wet tap 5 days ago during placement of a labor epidural.  A blood patch was performed 2 days ago with relief but headache returned today.  There is no diplopia or tinnitus.  Repeat blood patch performed now with relief of headache.  See anesthesia record